# Patient Record
Sex: MALE | Race: BLACK OR AFRICAN AMERICAN | NOT HISPANIC OR LATINO | Employment: OTHER | URBAN - METROPOLITAN AREA
[De-identification: names, ages, dates, MRNs, and addresses within clinical notes are randomized per-mention and may not be internally consistent; named-entity substitution may affect disease eponyms.]

---

## 2017-01-05 ENCOUNTER — CLINICAL SUPPORT (OUTPATIENT)
Dept: REHABILITATION | Facility: HOSPITAL | Age: 57
End: 2017-01-05
Attending: NEUROLOGICAL SURGERY
Payer: MEDICARE

## 2017-01-05 DIAGNOSIS — M47.819 SPONDYLOSIS WITHOUT MYELOPATHY: Primary | ICD-10-CM

## 2017-01-05 DIAGNOSIS — M54.41 LOW BACK PAIN WITH RIGHT-SIDED SCIATICA, UNSPECIFIED BACK PAIN LATERALITY, UNSPECIFIED CHRONICITY: ICD-10-CM

## 2017-01-05 PROCEDURE — 97161 PT EVAL LOW COMPLEX 20 MIN: CPT | Performed by: PHYSICAL MEDICINE & REHABILITATION

## 2017-01-05 PROCEDURE — G8979 MOBILITY GOAL STATUS: HCPCS | Mod: CJ | Performed by: PHYSICAL MEDICINE & REHABILITATION

## 2017-01-05 PROCEDURE — 97110 THERAPEUTIC EXERCISES: CPT | Performed by: PHYSICAL MEDICINE & REHABILITATION

## 2017-01-05 PROCEDURE — G8978 MOBILITY CURRENT STATUS: HCPCS | Mod: CK | Performed by: PHYSICAL MEDICINE & REHABILITATION

## 2017-01-05 NOTE — PLAN OF CARE
Create Note         My Note 3:58 PM   Edit              Name:[facility]  Physician:Gildardo Chu MD  Date of eval:1/5/2017  Orders: Physical Therapy evaluate and treat  Clinic: [id number]  Diagnosis:  1. Spondylosis without myelopathy      2. Low back pain with right-sided sciatica, unspecified back pain laterality, unspecified chronicity            Precautions: None  Evaluation date: 1/5/2017  Visit # authorized: 1/30  Authorization period: 12/31/17  Plan of care expiration: 3/2/17     Start time:4:15 PM  Stop Time: 5:15 PM  Procedures: IE, TE     Timed:  Procedure: TE  Minutes: 30     Untimed:  Procedure: IE  Minutes: 30     Total Timed Minutes: 60  Total Timed Units: 2  Total Untimed Units: 1  Charged Billed # of Units: 3     Medicare Charge: $140.00  Medicare Cap Total: $140.00     Subjective      Chief complaint: Patient states having pain in right side of his back and into RLE for about 20 years. States fell down a flight of stairs and was in a coma for 3 days 20 years ago and has had pain in right low back and RLE since. States has not ever had any physical therapy.  Onset of pain : 20 years ago  Mechanism of onset : Fell down flight of stairs  PMH: High Blood Pressure; Diabetes     Aggravating factors: walking, sweeping  Easing factors: pain medication daily,   Sleep is  not disturbed. Sleeping position: on sides  Loss of Bowel/Bladder Function: (-)  PLOF  Includes:Able to perform self care; fix light meals and perform light housekeeping   Current functional limitations: Walking     Home/Living Environment: lives with sister     Pertinent PMH/Comorbidities: None     Patients structured exercise routine: rides regular bike  Exercise routine prior to onset: rides regular bike     Occupation: Pt is disabled and on disability.     MRI: 1.  There is degenerative spondylosis as detailed above.  There is an annular fissure within the far left lateral region of the L3-L4 disc which may account  "for a discogenic pain symptoms.  There is also mild to moderate bilateral foraminal stenosis, left greater than right, at L3-L4.  This could potentially affect the L3 nerves.  2.  There is mild to moderate left foraminal stenosis L4-L5.       Xray: Results: There is straightening of the normal lumbar lordosis with neutral positioning. No significant listhesis with flexion and extension positioning. The lumbar vertebral body heights and contours are within normal limits without evidence for acute fracture or subluxation. There is a slight convex left curvature. Further evaluation as warrented clinically.      Pain level with 0 being the lowest and 10 being the highest presently: 0/10  Pain level with 0 being the lowest and 10 being the highest at worst: 6/10  Pain level with 0 being the lowest and 10 being the highest at best: 0/10     Patient Goals: "I want to be able to walk without pain"     Objective      Postural examination in standing and sitting:  -  (+) forward head  -  (+) Forward shoulders  - (-) hip high  -(-) shoulder high  - (+)decreased lumbar lordosis  - (-)Thoracic kyphosis         Functional assessment:   - walking/gait: ambulated into clinic using rollator. Patient hikes and circumducts right hip when ambulating and leans forward with his trunk. Patient states he does this because it is painful to put weight on RLE. Patient is able to ambulate weightbearing on RLE with hip flexion/extension and right knee flexion and extension.  - sit to stand: Independent  - sit to supine: Independent   - supine to sit: Independent  - supine to prone: Independent      Flexibility testing:  - hamstrings: 90/90 test R -40 L -30   - gastrocnemius: DF ankle R 10 degrees L 10 degrees  - piriformis: (+) For tightness bilaterally   - quadriceps: (+)  For tightness bilaterally   - hip flexors:(+) For tightness bilaterally     Lumbar ROM: (measured in degrees)     Degrees Quality   Flexion    50 No c/o pain   Extension 10  " "C/o tightness in low back   Left Side Bending 20 C/o tightness in low back   Right Side Bending 20  C/o tightness in low back                      AROM bilateral hips WNL  AROM bilateral knees WNL     Dermatomes: (impaired/normal)    RLE LLE   L2 Intact Intact   L3 Intact Intact   L4 Intact Intact   L5 Intact Intact   S1 Intact Intact      Muscle Strength  MMT R L   Hip flexion 5/5 5/5   Hip abduction 5/5 5/5   Hip extension 5/5 5/5   Hip ER 5/5 5/5   Hip IR 5/5 5/5   Knee extension 5/5 5/5   Knee flexion 5/5 5/5   Ankle dorsiflexion 5/5 5/5   Ankle plantar flexion 5/5 5/5   Ankle inversion 5/5 5/5   Ankle eversion 5/5 5/5      Reflexes: 2+ bilateral LE's DTR     Special Tests: ((+): pos.; (-): neg.)   · SLR Test: (-) bilaterally  SKC Test: (-) bilateraally  · Hip Screen: (+) for hip flexor tightness bilaterally  · Repetitive Motion: Neither flexion or extension creates pain. Patient feels tightness in low back with dotson motions  · Prone Instability:NT  · Saddle Sensation: (-)     Palpation for condition: No c/o pain in lumbar spine with palpation     Joint Mobility: Hypomobility all lumbar vertebra     Endurance is Good     PT Evaluation Completed? Yes  Discussed Plan of Care with patient: Yes     Outcome measures:   Impairment function: Mobility  FOTO score: 42/100  · G-code current: CK at least 40% but < 60% impaired, limited or restricted   · G-code goal: CJ at least 20%, but < 40% impaired, limited or restricted   · Severity modifier selections based on the FOTO scoring, objective findings, and co-morbidity assessment  · PT reviewed FOTO scores for Sam Flores on 1/5/17.   · FOTO scores were entered into [facility] - see media section.     TREATMENT:  Therapeutic exercise: Sam received therapeutic exercises to develop strength, stabilization and endurance; flexibility and range of motion for 30 minutes including:see HEP sheet.   LTR X 10  Supine HSS 2 x 30" (B)  Supine Piriformis (S) (B) 2 x " "30'  DKTC X 10  Prone quad/hip flexor (S) (B) 2 x 30"  Prone on elbows press up X 10  Standing Back Extension X 10     Pt. Education: Instructed pt. regarding:proper technique with all exercises. Pt. to demonstrate good understanding of the education provided. Sam demonstrated good return demonstration of activities. No cultural, environmental, or spiritual barriers identified to treatment or learning.     Assessment   This is a 56 y.o. male referred to outpatient physical therapy and presents with a medical diagnosis of spondylosis without myelopathy and PT diagnosis/findings of gait abnormality and decreased flexibility of lumbar spine and lower extremities, demonstrating limitations as described in the problem list. Patient was in agreement with set goals and plan of care. Pt was given a written HEP along with posture education, instruction on body mechanics, activity modification/avoidance, and stretching exercises to low back and BLE's. Pt. verbally understood instructions and demonstrated proper form/technique. Pt was advised to perform these exercises free of pain, and discontinue use if symptoms persist/worsen. Pt will benefit from physical therapy services in order to maximize pain free functional independence. Rehab potential is good. Patient instructed to weight bear on RLE when ambulating.     Level of complexity is low based on patient history including the following comorbidities: N/A and/or personal factors: N/A that will impact the plan of care; the examination that includes body structure and function including the following body region(s): lumbar spine and bilateral lower extremities; body systems including musculoskeletal decreased AROM lumbar spine and decreased flexibility of lumbar spine and BLE's; neuromuscular gait abnomality; cardiovascular N/A; integumentary N/A; and activity limitations and participation limitations including mobility; FOTO outcomes score of 42/100; with a clinical " presentation of stable and uncomplicatedcharacteristics.     Medical necessity is demonstrated by the following IMPAIRMENTS/PROBLEM LIST:  Decreased range of motion  Increased pain with walking  Antalgic gait  Increased pain and limited with climbing stairs  ADL and household activities lead to increased pain and are limited  Functional impairment rating of: 50%     GOALS:   Short Term Goals: 4 weeks  Increase range of motion 25%  Be able to perform HEP with minimal cueing required  Ambulate with FWB RLE using rollator  Improve functional impairment of mobility to 30%     Long Term Goals: 8 weeks  Increase range of motion to 75% to 100% full   Restore ability to ambulate with normal pain free gait  Walking for ADL and exercise will be restored without increased pain  Restore ability to stand for ADL without increased pain  Restore ability to climb stairs in a reciprocal manner with min to 0 increased pain and/or difficulty  Restore ability to perform ADL's and household activities independently and without increased pain  Improve functional impairment of mobility to 20% or less     Plan      Pt will be treated by physical therapy 1-3 times a week for 8 weeks to include: Therapeutic exercises to increase ROM, strength and stabilization; joint and soft tissue mobilization with manual therapy techniques to decrease muscle tightness, pain and improve joint mobility; neuromuscular re-education to improve balance, coordination, kinesthetic sense and proprioception, therapeutic activities to improve coordination, strength and function, therapeutic taping to decrease pain, provide support and improve function of the LE(s); modalities such as moist heat, ice, ultrasound and electrical stimulation to increase circulation, decrease pain and inflammation; dry needling with manual therapy techniques to decrease pain, inflammation and swelling, increase circulation and promote healing process will be considered and utilized as  needed; aquatic physical therapy will be utilized as needed. Pt may be seen by PTA to carry out plan of care as part of the Rehab team.     I certify the need for these services furnished under this plan of treatment and while under my care.     ____________________________________ Physician/Referring Practitioner                  Date of Signature              Angelica Mckeon PT

## 2017-01-05 NOTE — PROGRESS NOTES
Name:[facility]  Physician:Gildardo Chu MD  Date of eval:1/5/2017  Orders:  Physical Therapy evaluate and treat  Clinic: [id number]  Diagnosis:  1. Spondylosis without myelopathy     2. Low back pain with right-sided sciatica, unspecified back pain laterality, unspecified chronicity         Precautions: None  Evaluation date: 1/5/2017  Visit # authorized: 1/30  Authorization period: 12/31/17  Plan of care expiration: 3/2/17    Start time:4:15 PM  Stop Time: 5:15 PM  Procedures: IE, TE    Timed:  Procedure: TE  Minutes:  30    Untimed:  Procedure: IE  Minutes: 30    Total Timed Minutes: 60  Total Timed Units: 2  Total Untimed Units: 1  Charged Billed # of Units: 3    Medicare Charge: $140.00  Medicare Cap Total: $140.00    Subjective     Chief complaint: Patient states having pain in right side of his back and into RLE for about 20 years.   fell down a flight of stairs and was in a coma for 3 days 20 years ago and has had pain in right low back and RLE since. States has not ever had any physical therapy.  Onset of pain : 20 years ago  Mechanism of onset :  Fell down flight of stairs  PMH: High Blood Pressure; Diabetes    Aggravating factors: walking, sweeping  Easing factors: pain medication daily,   Sleep is  not disturbed. Sleeping position: on sides  Loss of Bowel/Bladder Function: (-)  PLOF  Includes:Able to perform self care; fix light meals and perform light housekeeping   Current functional limitations: Walking    Home/Living Environment: lives with sister    Pertinent PMH/Comorbidities:  None    Patients structured exercise routine: rides regular bike  Exercise routine prior to onset: rides regular bike    Occupation: Pt is disabled and on disability.    MRI: 1.  There is degenerative spondylosis as detailed above.  There is an annular fissure within the far left lateral region of the L3-L4 disc which may account for a discogenic pain symptoms.  There is also mild to moderate bilateral  "foraminal stenosis, left greater than right, at L3-L4.  This could potentially affect the L3 nerves.  2.  There is mild to moderate left foraminal stenosis L4-L5.             Xray:  Results: There is straightening of the normal lumbar lordosis with neutral positioning. No significant listhesis with flexion and extension positioning. The lumbar vertebral body heights and contours are within normal limits without evidence for acute fracture or subluxation. There is a slight convex left curvature. Further evaluation as warrented clinically.     Pain level with 0 being the lowest and 10 being the highest presently: 0/10  Pain level with 0 being the lowest and 10 being the highest at worst: 6/10  Pain level with 0 being the lowest and 10 being the highest at best: 0/10     Patient Goals: "I want to be able to walk without pain"    Objective     Postural examination in standing and sitting:  -   (+) forward head  -  (+) Forward shoulders  - (-) hip high  -(-) shoulder high  - (+)decreased lumbar lordosis  -  (-)Thoracic kyphosis        Functional assessment:   - walking/gait: ambulated into clinic using rollator. Patient hikes and circumducts right hip when ambulating and leans forward with his trunk.  Patient states he does this because it is painful to put weight on RLE.  Patient is able to ambulate weightbearing on RLE with hip flexion/extension and right knee flexion and extension.  - sit to stand: Independent  - sit to supine: Independent       - supine to sit: Independent  - supine to prone: Independent     Flexibility testing:  - hamstrings:     90/90 test R -40 L -30           - gastrocnemius:   DF ankle R 10 degrees L 10 degrees  - piriformis:   (+)   For tightness bilaterally            - quadriceps:  (+)   For tightness bilaterally             - hip flexors:(+)  For tightness bilaterally    Lumbar ROM: (measured in degrees)    Degrees Quality   Flexion   50 No c/o pain   Extension 10  C/o tightness in low back " "  Left Side Bending 20 C/o tightness in low back   Right Side Bending 20  C/o tightness in low back               AROM bilateral hips WNL  AROM bilateral knees WNL    Dermatomes: (impaired/normal)     RLE LLE   L2 Intact Intact   L3 Intact Intact   L4 Intact Intact   L5 Intact Intact   S1 Intact Intact     Muscle Strength  MMT R L   Hip flexion 5/5 5/5   Hip abduction 5/5 5/5   Hip extension 5/5 5/5   Hip ER 5/5 5/5   Hip IR 5/5 5/5   Knee extension 5/5 5/5   Knee flexion 5/5 5/5   Ankle dorsiflexion 5/5 5/5   Ankle plantar flexion 5/5 5/5   Ankle inversion 5/5 5/5   Ankle eversion 5/5 5/5     Reflexes: 2+ bilateral LE's DTR    Special Tests: ((+): pos.; (-): neg.)   · SLR Test: (-)  bilaterally        SKC Test: (-)   bilateraally  · Hip Screen: (+) for hip flexor tightness bilaterally  · Repetitive Motion:  Neither flexion or extension creates pain. Patient feels tightness in low back with dotson motions  · Prone Instability:NT  · Saddle Sensation: (-)    Palpation for condition: No c/o pain in lumbar spine with palpation    Joint Mobility: Hypomobility all lumbar vertebra    Endurance is Good    PT Evaluation Completed? Yes  Discussed Plan of Care with patient: Yes    Outcome measures:    Impairment function:  Mobility  FOTO score: 42/100  G-code current: CK at least 40% but < 60% impaired, limited or restricted     G-code goal: CJ at least 20%, but < 40% impaired, limited or restricted          Severity modifier selections based on the FOTO scoring, objective findings, and co-morbidity assessment  PT reviewed FOTO scores for Sam Flores on 1/5/17.   FOTO scores were entered into [employer] - see media section.    TREATMENT:  Therapeutic exercise: Sam received therapeutic exercises to develop strength, stabilization and endurance; flexibility and range of motion for 30 minutes including:see HEP sheet.   LTR   X 10  Supine HSS  2 x 30"   (B)  Supine Piriformis (S) (B)   2 x 30'  DKTC       X 10  Prone " "quad/hip flexor (S)  (B)  2 x 30"  Prone on elbows press up   X 10  Standing Back Extension   X 10    Pt. Education: Instructed pt. regarding:proper technique with all exercises. Pt. to demonstrate good understanding of the education provided. [place] demonstrated good return demonstration of activities. No cultural, environmental, or spiritual barriers identified to treatment or learning.    Assessment   This is a 56 y.o. male referred to outpatient physical therapy and presents with a medical diagnosis of spondylosis without myelopathy and PT diagnosis/findings of gait abnormality and decreased flexibility of lumbar spine and lower extremities, demonstrating limitations as described in the problem list. Patient was in agreement with set goals and plan of care. Pt was given a written HEP along with posture education, instruction on body mechanics, activity modification/avoidance, and stretching exercises to low back and BLE's. Pt. verbally understood instructions and demonstrated proper form/technique. Pt was advised to perform these exercises free of pain, and discontinue use if symptoms persist/worsen. Pt will benefit from physical therapy services in order to maximize pain free functional independence. Rehab potential is good. Patient instructed to weight bear on RLE when ambulating.    Level of complexity is low based on patient history including the following comorbidities: N/A and/or personal factors: N/A that will impact the plan of care; the examination that includes body structure and function including the following body region(s): lumbar spine and bilateral lower extremities; body systems including musculoskeletal decreased AROM lumbar spine and decreased flexibility of lumbar spine and BLE's; neuromuscular gait abnomality; cardiovascular N/A; integumentary N/A; and activity limitations and participation limitations including mobility; FOTO outcomes score of 42/100; with a clinical presentation of " stable and uncomplicatedcharacteristics.    Medical necessity is demonstrated by the following IMPAIRMENTS/PROBLEM LIST:  Decreased range of motion  Increased pain with walking  Antalgic gait  Increased pain and limited with climbing stairs  ADL and household activities lead to increased pain and are limited  Functional impairment rating of: 50%    GOALS:   Short Term Goals:  4 weeks  Increase range of motion 25%  Be able to perform HEP with minimal cueing required  Ambulate with FWB RLE using rollator  Improve functional impairment of mobility to 30%    Long Term Goals: 8 weeks  Increase range of motion to 75% to 100% full   Restore ability to ambulate with normal pain free gait  Walking for ADL and exercise will be restored without increased pain  Restore ability to stand for ADL without increased pain  Restore ability to climb stairs in a reciprocal manner with min to 0 increased pain and/or difficulty  Restore ability to perform ADL's and household activities independently and without increased pain  Improve functional impairment of mobility to 20% or less    Plan     Pt will be treated by physical therapy 1-3 times a week for 8 weeks to include: Therapeutic exercises to increase ROM, strength and stabilization; joint and soft tissue mobilization with manual therapy techniques to decrease muscle tightness, pain and improve joint mobility; neuromuscular re-education to improve balance, coordination, kinesthetic sense and proprioception, therapeutic activities to improve coordination, strength and function, therapeutic taping to decrease pain, provide support and improve function of the LE(s); modalities such as moist heat, ice, ultrasound and electrical stimulation to increase circulation, decrease pain and inflammation; dry needling with manual therapy techniques to decrease pain, inflammation and swelling, increase circulation and promote healing process will be considered and utilized as needed;  aquatic  physical therapy will be utilized as needed.  Pt may be seen by PTA to carry out plan of care as part of the Rehab team.    I certify the need for these services furnished under this plan of treatment and while under my care.    ____________________________________ Physician/Referring Practitioner                                  Date of Signature          Angelica Mckeon, PT      Noemy Pretty, RICKI, PA-C  Neuro[facility]  [facility]  01/09/17

## 2017-01-10 ENCOUNTER — CLINICAL SUPPORT (OUTPATIENT)
Dept: REHABILITATION | Facility: HOSPITAL | Age: 57
End: 2017-01-10
Attending: NEUROLOGICAL SURGERY
Payer: MEDICARE

## 2017-01-10 DIAGNOSIS — G89.29 CHRONIC RIGHT-SIDED LOW BACK PAIN WITH RIGHT-SIDED SCIATICA: ICD-10-CM

## 2017-01-10 DIAGNOSIS — M54.41 CHRONIC RIGHT-SIDED LOW BACK PAIN WITH RIGHT-SIDED SCIATICA: ICD-10-CM

## 2017-01-10 DIAGNOSIS — M47.819 SPONDYLOSIS WITHOUT MYELOPATHY: Primary | ICD-10-CM

## 2017-01-10 PROCEDURE — 97112 NEUROMUSCULAR REEDUCATION: CPT | Performed by: PHYSICAL MEDICINE & REHABILITATION

## 2017-01-10 PROCEDURE — 97110 THERAPEUTIC EXERCISES: CPT | Performed by: PHYSICAL MEDICINE & REHABILITATION

## 2017-01-10 NOTE — PROGRESS NOTES
"Name: [facility] Number: [id number]  Diagnosis:   Encounter Diagnoses   Name Primary?    Chronic right-sided low back pain with right-sided sciatica     Spondylosis without myelopathy Yes     Physician: Gildardo Chu MD  Treatment Orders: PT Eval and Treat  Past Medical History   Diagnosis Date    Diabetes mellitus, type 2     Hypertension        Precautions: None  Visit # authorized: 1/30 on 1/10/2017  Authorization period: 12/31/17  Plan of care expiration: 3/2/17  Start time: 2:30 PM  Stop Time:   Procedures:  TE,NMRE      Timed:  Procedure: TE, x 2, NMRE  X 1  Minutes: 45      Untimed:  Procedure:   Minutes:       Total Timed Minutes: 45  Total Timed Units:  3  Total Untimed Units: 0  Charged Billed # of Units: 3      Medicare Charge: $98.00  Medicare Cap Total: $238.00    Subjective     Pt reports: Patient states not having any pain in low back or right LE today.  States has been performing HEP    Pain Scale: before treatment: 0/10 currently; after treatment: 0/10    Objective       TREATMENT  Therapeutic exercise: Sam received therapeutic exercises to develop flexibility for 30 minutes including:   LTR X 10  Supine HSS 2 x 30" (B)  Supine Piriformis (S) (B) 2 x 30'  DKTC X 10  Prone quad/hip flexor (S) (B) 2 x 30"  Prone on elbows press up X 10  Standing Back Extension X 10  Sitting Arch/Flatten(back extension)  10 x 5" hold  Stationary Bike  X 5' NC    Neuromuscular Re-education for balance and gait   X 15 minutes:  STanding in Parallell bars::  Marching   X 10  Mini Squats  X 10  Ambulation in parallel bars with emphasis on bending right knee in toe off phase to eliminate hip hiking and cicumduction x 4 length of bars  Ambulation with rollator with empahsis of right knee flexion in toe off  ~100'    Written Home Exercises Provided: Standing marching and mini squats; back extensor exercises  Pt demo good understanding of the education provided. Sam demonstrated good return " demonstration of activities.     Pt. education:  · Posture reeducation, body mechanics, HEP,   · No spiritual or educational barriers to learning provided  · Pt has no cultural, educational or language barriers to learning provided.    Assessment     Patient performed above exercise program with verbal cueing for proper technique and tolerated addition of back extensor exercises and neuro re-ed for improved balance and to ambulate with proper gait pattern on RLE. Patient continues to hip hike and circumduct right hip and not flex right knee in toe off phase. Patient instructed to work on flexing right knee in toe off phase when walking. Pt will continue to benefit from skilled outpatient physical therapy to address the remaining functional deficits, provide pt/family education, and to maximize pt's level of independence in the home and community environment. .     Goals:   Short Term Goals: 4 weeks  Increase range of motion 25%  Be able to perform HEP with minimal cueing required  Ambulate with FWB RLE using rollator  Improve functional impairment of mobility to 30%      Long Term Goals: 8 weeks  Increase range of motion to 75% to 100% full   Restore ability to ambulate with normal pain free gait  Walking for ADL and exercise will be restored without increased pain  Restore ability to stand for ADL without increased pain  Restore ability to climb stairs in a reciprocal manner with min to 0 increased pain and/or difficulty  Restore ability to perform ADL's and household activities independently and without increased pain  Improve functional impairment of mobility to 20% or less    Anticipated barriers to physical therapy: None  Pt's spiritual, cultural and educational needs considered and pt agreeable to plan of care and goals        Plan   Continue with established Plan of Care towards PT goals.              Angelica Mckeon, PT

## 2017-01-11 ENCOUNTER — CLINICAL SUPPORT (OUTPATIENT)
Dept: REHABILITATION | Facility: HOSPITAL | Age: 57
End: 2017-01-11
Attending: NEUROLOGICAL SURGERY
Payer: MEDICARE

## 2017-01-11 DIAGNOSIS — M47.819 SPONDYLOSIS WITHOUT MYELOPATHY: Primary | ICD-10-CM

## 2017-01-11 PROCEDURE — 97112 NEUROMUSCULAR REEDUCATION: CPT

## 2017-01-11 PROCEDURE — 97110 THERAPEUTIC EXERCISES: CPT

## 2017-01-11 NOTE — PROGRESS NOTES
"Name: [facility] Number: [id number]  Diagnosis:   Encounter Diagnoses   Name Primary?    Chronic right-sided low back pain with right-sided sciatica     Spondylosis without myelopathy Yes     Physician: Gildardo Chu MD  Treatment Orders: PT Eval and Treat  Past Medical History   Diagnosis Date    Diabetes mellitus, type 2     Hypertension        Precautions: None  Visit # authorized: 2/30 on 1/11/2017  Authorization period: 12/31/17  Plan of care expiration: 3/2/17  Start time: 3:00 PM  Stop Time: 3:55  Procedures:  TE,NMRE      Timed:  Procedure: TE, x 3, NMRE  X 1  Minutes: 55      Untimed:  Procedure:   Minutes:       Total Timed Minutes: 55  Total Timed Units:  3  Total Untimed Units: 0  Charged Billed # of Units: 3 (TE-3, NMRE-1)      Medicare Charge: $129.33  Medicare Cap Total: $367.33    Subjective     Pt reports: Patient states not having any pain in low back or right LE today.  States has been performing HEP    Pain Scale: before treatment: 0/10 currently; after treatment: 0/10    Objective     Patient ambulated into the clinic with 2 axillary crutches requiring cues for R knee bending to avoid hip circumduction.     TREATMENT  Therapeutic exercise: Sam received therapeutic exercises to develop flexibility for 45 minutes including:   LTR X 15  Supine HSS 2 x 30" (B)  Supine Piriformis (S) (B) 2 x 30"  DKTC X 15  TA's 10 x 3"  Bridging w/TA 1 x 10  March w/ TA 1 x 10  Prone quad/hip flexor (S) (B) 2 x 30"  Prone on elbows press up X 10  Standing Back Extension 10 x 30"  Standing shoulder ext w/ feet staggered 1 x 10 GTB  Sitting Arch/Flatten(back extension)  10 x 5" hold  Stationary Bike  L5 x 5' NC    Neuromuscular Re-education for balance and gait   X 15 minutes:  STanding in Parallell bars::  Marching   X 10  Mini Squats  X 10  Ambulation in parallel bars with emphasis on bending right knee in toe off phase to eliminate hip hiking and cicumduction x 4 length of bars  Ambulation " with crutches with empahsis of right knee flexion in toe off  ~100'    Written Home Exercises Provided: supine transverse abdominus activation, bridging w/ TA, supine marching w/ TA and standing shoulder extension w/ feet staggered.   Pt demo good understanding of the education provided. Sam demonstrated good return demonstration of activities.     Pt. education:  · Posture reeducation, body mechanics, HEP,   · No spiritual or educational barriers to learning provided  · Pt has no cultural, educational or language barriers to learning provided.    Assessment     Patient performed above exercise program with verbal cueing for proper technique and tolerated addition of back extensor exercises and neuro re-ed for improved balance and to ambulate with proper gait pattern on RLE. Patient continues to hip hike and circumduct right hip and not flex right knee in toe off phase. Patient instructed to work on flexing right knee in toe off phase when walking. Pt will continue to benefit from skilled outpatient physical therapy to address the remaining functional deficits, provide pt/family education, and to maximize pt's level of independence in the home and community environment. .     Goals:   Short Term Goals: 4 weeks  Increase range of motion 25%  Be able to perform HEP with minimal cueing required  Ambulate with FWB RLE using rollator  Improve functional impairment of mobility to 30%      Long Term Goals: 8 weeks  Increase range of motion to 75% to 100% full   Restore ability to ambulate with normal pain free gait  Walking for ADL and exercise will be restored without increased pain  Restore ability to stand for ADL without increased pain  Restore ability to climb stairs in a reciprocal manner with min to 0 increased pain and/or difficulty  Restore ability to perform ADL's and household activities independently and without increased pain  Improve functional impairment of mobility to 20% or less    Anticipated  barriers to physical therapy: None  Pt's spiritual, cultural and educational needs considered and pt agreeable to plan of care and goals        Plan   Continue with established Plan of Care towards PT goals.

## 2017-01-12 ENCOUNTER — OFFICE VISIT (OUTPATIENT)
Dept: SPINE | Facility: CLINIC | Age: 57
End: 2017-01-12
Payer: MEDICARE

## 2017-01-12 VITALS
BODY MASS INDEX: 17.03 KG/M2 | WEIGHT: 115 LBS | DIASTOLIC BLOOD PRESSURE: 80 MMHG | HEIGHT: 69 IN | HEART RATE: 100 BPM | SYSTOLIC BLOOD PRESSURE: 152 MMHG

## 2017-01-12 DIAGNOSIS — M47.819 SPONDYLOSIS WITHOUT MYELOPATHY: ICD-10-CM

## 2017-01-12 DIAGNOSIS — G89.29 CHRONIC PAIN OF RIGHT KNEE: Primary | ICD-10-CM

## 2017-01-12 DIAGNOSIS — M54.17 THORACIC AND LUMBOSACRAL NEURITIS: ICD-10-CM

## 2017-01-12 DIAGNOSIS — M25.561 CHRONIC PAIN OF RIGHT KNEE: Primary | ICD-10-CM

## 2017-01-12 DIAGNOSIS — M54.14 THORACIC AND LUMBOSACRAL NEURITIS: ICD-10-CM

## 2017-01-12 DIAGNOSIS — M54.41 CHRONIC MIDLINE LOW BACK PAIN WITH RIGHT-SIDED SCIATICA: ICD-10-CM

## 2017-01-12 DIAGNOSIS — G89.29 CHRONIC MIDLINE LOW BACK PAIN WITH RIGHT-SIDED SCIATICA: ICD-10-CM

## 2017-01-12 DIAGNOSIS — M51.37 DDD (DEGENERATIVE DISC DISEASE), LUMBOSACRAL: ICD-10-CM

## 2017-01-12 PROCEDURE — 99999 PR PBB SHADOW E&M-EST. PATIENT-LVL III: CPT | Mod: PBBFAC,,, | Performed by: PHYSICIAN ASSISTANT

## 2017-01-12 PROCEDURE — 99213 OFFICE O/P EST LOW 20 MIN: CPT | Mod: PBBFAC | Performed by: PHYSICIAN ASSISTANT

## 2017-01-12 PROCEDURE — 99213 OFFICE O/P EST LOW 20 MIN: CPT | Mod: S$PBB,,, | Performed by: PHYSICIAN ASSISTANT

## 2017-01-12 NOTE — PROGRESS NOTES
"Subjective:     Patient ID:  Sam Flores is a 56 y.o. male.      Chief Complaint: Back pain and right leg pain    HPI    Sam Flores is a 56 y.o. male who presents for MRI follow up.  He has been going to PT for his back and leg and he feels like it is helping.  He continue to have pain in the low back and pain in the right anterior thigh to the knee and right knee pain.  He has some stiffness in the right leg also.  No left leg pain.    His gait has improved but still abnormal.    Patient denies any recent accidents or trauma, no saddle anesthesias, and no bowel or bladder incontinence.      Review of Systems:  Constitution: Negative for chills, fever, night sweats and weight loss.   Musculoskeletal: Negative for falls.   Gastrointestinal: Negative for bowel incontinence, nausea and vomiting.   Genitourinary: Negative for bladder incontinence.   Neurological: Negative for disturbances in coordination and loss of balance.     Objective:      Vitals:    01/12/17 1605   BP: (!) 152/80   Pulse: 100   Weight: 52.2 kg (115 lb)   Height: 5' 9" (1.753 m)         Physical Exam:    General:  Sam Flores is well-developed, well-nourished, appears stated age, in no acute distress, alert and oriented to person, place, and time.    Patient sits comfortably in the exam room and answers questions appropriately. Grossly patient is able to move bilateral lower extremities without difficulty.     MRI Interpretation:     Lumbar spine MRI was personally reviewed today.  Mild DDD at L3-4 and L4-5.  There is bilateral NFS at L3-4 and L4-5 worse at L3-4.    Assessment:          1. Chronic pain of right knee    2. Spondylosis without myelopathy    3. DDD (degenerative disc disease), lumbosacral    4. Thoracic and lumbosacral neuritis    5. Chronic midline low back pain with right-sided sciatica             Plan:          Orders Placed This Encounter    Procedure Order to [facility]    Ambulatory referral to " Orthopedics     L3-4, L4-5 DDD and bilateral NFS at L3-4 and L4-5.  He may have a right L3 radiculopathy    -Continue PT  -Right L3 TESI through Dr. Carpenter in [place]  -Right knee pain and abnormal gait.  I am not sure if this is related to his right hip or knee.  I would like ortho to look at his right hip and knee and see if this may be contributing to his right leg swinging some when he walks    FU in three months      Follow-Up:  Return in about 3 months (around 4/12/2017). If there are any questions prior to this, the patient was instructed to contact the office.       Noemy Pretty, [place] [facility], PA-C  Neurosurgery  [facility]  [facility]

## 2017-01-12 NOTE — MR AVS SNAPSHOT
[facility]  [address]  [address]  [place] [address]  Phone: [phone]  Fax: [phone]                  Sam Flores   2017 5:00 PM   Office Visit    Description:  Male : 1960   Provider:  Noemy Pretty PA-C   Department:  [facility]           Reason for Visit     Follow-up           Diagnoses this Visit        Comments    Chronic pain of right knee    -  Primary     Spondylosis without myelopathy         DDD (degenerative disc disease), lumbosacral         Thoracic and lumbosacral neuritis         Chronic midline low back pain with right-sided sciatica                To Do List           Future Appointments        Provider Department Dept Phone    2017 5:00 PM Noemy Pretty PA-C [facility] [phone]    2017 3:00 PM Angelica Mckeon, PT [facility] [phone]    2017 1:45 PM Catrachito Cortez PA-C [facility] [phone]    2017 3:00 PM Angelica Mckeon, PT [facility] [phone]    2017 3:00 PM Haseeb Mcgill PTA [facility] [phone]      Goals (5 Years of Data)     None      Follow-Up and Disposition     Return in about 3 months (around 2017).      [facility] -  Assistance  Registered nurses in the [facility] provide clinical advisement, health education, appointment booking, and other advisory services.  Call for this free service at [phone].             Medications           Message regarding Medications     Verify the changes and/or additions to your medication regime listed below are the same as discussed with your clinician today.  If any of these changes or additions are incorrect, please notify your healthcare provider.             Verify that the below list of medications is an accurate representation of the medications you are  "currently taking.  If none reported, the list may be blank. If incorrect, please contact your healthcare provider. Carry this list with you in case of emergency.           Current Medications     glipiZIDE (GLUCOTROL) 10 MG TR24 Take 5 mg by mouth daily with breakfast.    lisinopril (PRINIVIL,ZESTRIL) 20 MG tablet Take 20 mg by mouth once daily.    metformin (FORTAMET) 500 mg 24 hr tablet Take 500 mg by mouth daily with breakfast.    naproxen (NAPROSYN) 375 MG tablet Take 375 mg by mouth 2 (two) times daily.    pravastatin (PRAVACHOL) 20 MG tablet Take 20 mg by mouth once daily.    sucralfate (CARAFATE) 1 gram tablet Take 1 g by mouth 4 (four) times daily.           Clinical Reference Information           Vital Signs - Last Recorded  Most recent update: 1/12/2017  4:06 PM by Cassidy Salazar    BP Pulse Ht Wt BMI    (!) 152/80 100 5' 9" (1.753 m) 52.2 kg (115 lb) 16.98 kg/m2      Blood Pressure          Most Recent Value    BP  (!)  152/80      Allergies as of 1/12/2017     No Known Allergies      Immunizations Administered on Date of Encounter - 1/12/2017     None      Orders Placed During Today's Visit      Normal Orders This Visit    Ambulatory referral to Orthopedics     Future Labs/Procedures Expected by Expires    Procedure Order to [facility]  As directed 1/12/2018      "

## 2017-01-13 ENCOUNTER — TELEPHONE (OUTPATIENT)
Dept: PAIN MEDICINE | Facility: CLINIC | Age: 57
End: 2017-01-13

## 2017-01-13 NOTE — TELEPHONE ENCOUNTER
Tried calling patient to schedule procedure, right Lumbar TF CYNDI at L3 with Dr. Carpenter in [place], referred by Dr. Chu.  Got message mailbox full could not leave voice message

## 2017-01-16 ENCOUNTER — TELEPHONE (OUTPATIENT)
Dept: PAIN MEDICINE | Facility: CLINIC | Age: 57
End: 2017-01-16

## 2017-01-16 NOTE — TELEPHONE ENCOUNTER
Again tried contacting patient to schedule for a procedure with Dr. Carpenter, get unable to leave message mailbox is full.

## 2017-01-17 ENCOUNTER — CLINICAL SUPPORT (OUTPATIENT)
Dept: REHABILITATION | Facility: HOSPITAL | Age: 57
End: 2017-01-17
Attending: NEUROLOGICAL SURGERY
Payer: MEDICARE

## 2017-01-17 DIAGNOSIS — M54.41 LOW BACK PAIN WITH RIGHT-SIDED SCIATICA, UNSPECIFIED BACK PAIN LATERALITY, UNSPECIFIED CHRONICITY: ICD-10-CM

## 2017-01-17 DIAGNOSIS — M47.819 SPONDYLOSIS WITHOUT MYELOPATHY: Primary | ICD-10-CM

## 2017-01-17 PROCEDURE — 97112 NEUROMUSCULAR REEDUCATION: CPT | Performed by: PHYSICAL MEDICINE & REHABILITATION

## 2017-01-17 PROCEDURE — 97110 THERAPEUTIC EXERCISES: CPT | Performed by: PHYSICAL MEDICINE & REHABILITATION

## 2017-01-17 NOTE — PROGRESS NOTES
"Name: [facility] Number: [id number]  Diagnosis:   Encounter Diagnoses   Name Primary?    Low back pain with right-sided sciatica, unspecified back pain laterality, unspecified chronicity     Spondylosis without myelopathy Yes     Physician: Gildardo Chu MD  Treatment Orders: PT Eval and Treat  Past Medical History   Diagnosis Date    Diabetes mellitus, type 2     Hypertension        Precautions: None  Visit # authorized: 1/30 on 1/17/2017  Authorization period: 12/31/17  Plan of care expiration: 3/2/17  Start time: 2:40 PM  Stop Time: 3:40PM  Procedures:  TE,NMRE      Timed:  Procedure: TE, x 3, NMRE  X 1  Minutes: 60      Untimed:  Procedure:   Minutes:       Total Timed Minutes: 60  Total Timed Units:  4  Total Untimed Units: 0  Charged Billed # of Units: 4      Medicare Charge: $130.00  Medicare Cap Total: $368.00    Subjective     Pt reports: Patient states not having any pain in low back or right LE today.Patient states walks in house without AD; and out of house either uses rollator or crutches!  States has been performing HEP    Pain Scale: before treatment: 0/10 currently; after treatment: 0/10    Objective       TREATMENT  Therapeutic exercise: Sam received therapeutic exercises to develop flexibility for 45 minutes including:   LTR X 10  Supine HSS 2 x 30" (B)  Supine Piriformis (S) (B) 2 x 30'  DKTC     2 X 10  Prone quad/hip flexor (S) (B) 2 x 30"  Prone press up X 10 (therapist holding hips down)  TA's 10 x 3"  Bridging w/TA    2 x 10  March w/ TA     2 x 10  Quadriped:  Alt Arms    5 x 5" hold                      Alt Legs   3 x 5"  hold  Standing shoulder ext w/ feet staggered 2 x 10 GTB  Standing Back Extension X 10  Sitting Arch/Flatten(back extension)       2 x 10 x 5" hold  Stationary Bike  X 5' NC    Neuromuscular Re-education for balance and gait   X 15 minutes:  Standing in Parallell bars:    Marching   X 10  Side Stepping over cones  X 2 trips  Forward lunges  X " 10  Mini Squats    2  X 10  Ambulation in parallel bars with emphasis on bending right knee in toe off phase to eliminate hip hiking and cicumduction x 4 length of bars  Ambulation with one and two crutches with empahsis of right knee flexion in toe off  ~100'    Written Home Exercises Provided: Quadriped exercises for back extensor strengthening  Pt demo good understanding of the education provided. [place] demonstrated good return demonstration of activities.     Pt. education:  · Posture reeducation, body mechanics, HEP,   · No spiritual or educational barriers to learning provided  · Pt has no cultural, educational or language barriers to learning provided.    Assessment     Patient performed above exercise program with verbal cueing for proper technique and tolerated addition of quadriped exercises for back extensor strengthening. Worked with patient on hid gait--flexing right  hip and knee with toe off using one and two crutches. Patient tends to not really use crutches with walking. Patient still will circumduct right hip when trying to walk at a faster pace. Patient instructed to work on flexing right knee in toe off phase when walking. Pt will continue to benefit from skilled outpatient physical therapy to address the remaining functional deficits, provide pt/family education, and to maximize pt's level of independence in the home and community environment. .     Goals:   Short Term Goals: 4 weeks  Increase range of motion 25%  Be able to perform HEP with minimal cueing required  Ambulate with FWB RLE using rollator  Improve functional impairment of mobility to 30%      Long Term Goals: 8 weeks  Increase range of motion to 75% to 100% full   Restore ability to ambulate with normal pain free gait  Walking for ADL and exercise will be restored without increased pain  Restore ability to stand for ADL without increased pain  Restore ability to climb stairs in a reciprocal manner with min to 0 increased pain  and/or difficulty  Restore ability to perform ADL's and household activities independently and without increased pain  Improve functional impairment of mobility to 20% or less    Anticipated barriers to physical therapy: None  Pt's spiritual, cultural and educational needs considered and pt agreeable to plan of care and goals        Plan   Continue with established Plan of Care towards PT goals.              Angelica Mckeon, PT

## 2017-01-18 ENCOUNTER — OFFICE VISIT (OUTPATIENT)
Dept: ORTHOPEDICS | Facility: CLINIC | Age: 57
End: 2017-01-18
Payer: MEDICARE

## 2017-01-18 ENCOUNTER — HOSPITAL ENCOUNTER (OUTPATIENT)
Dept: RADIOLOGY | Facility: HOSPITAL | Age: 57
Discharge: HOME | End: 2017-01-18
Attending: ORTHOPAEDIC SURGERY
Payer: MEDICARE

## 2017-01-18 VITALS
BODY MASS INDEX: 17.04 KG/M2 | SYSTOLIC BLOOD PRESSURE: 147 MMHG | WEIGHT: 115.06 LBS | DIASTOLIC BLOOD PRESSURE: 85 MMHG | RESPIRATION RATE: 16 BRPM | HEIGHT: 69 IN | HEART RATE: 87 BPM

## 2017-01-18 DIAGNOSIS — M25.561 ACUTE PAIN OF RIGHT KNEE: ICD-10-CM

## 2017-01-18 DIAGNOSIS — M25.561 ACUTE PAIN OF RIGHT KNEE: Primary | ICD-10-CM

## 2017-01-18 PROCEDURE — 73562 X-RAY EXAM OF KNEE 3: CPT | Mod: 26,RT,, | Performed by: RADIOLOGY

## 2017-01-18 PROCEDURE — 20610 DRAIN/INJ JOINT/BURSA W/O US: CPT | Mod: S$PBB,RT,, | Performed by: PHYSICIAN ASSISTANT

## 2017-01-18 PROCEDURE — 73560 X-RAY EXAM OF KNEE 1 OR 2: CPT | Mod: 26,59,, | Performed by: RADIOLOGY

## 2017-01-18 PROCEDURE — 99214 OFFICE O/P EST MOD 30 MIN: CPT | Mod: S$PBB,25,, | Performed by: PHYSICIAN ASSISTANT

## 2017-01-18 PROCEDURE — 73560 X-RAY EXAM OF KNEE 1 OR 2: CPT | Mod: TC,59,LT

## 2017-01-18 PROCEDURE — 99999 PR PBB SHADOW E&M-EST. PATIENT-LVL III: CPT | Mod: PBBFAC,,, | Performed by: PHYSICIAN ASSISTANT

## 2017-01-18 RX ORDER — BETAMETHASONE SODIUM PHOSPHATE AND BETAMETHASONE ACETATE 3; 3 MG/ML; MG/ML
6 INJECTION, SUSPENSION INTRA-ARTICULAR; INTRALESIONAL; INTRAMUSCULAR; SOFT TISSUE
Status: COMPLETED | OUTPATIENT
Start: 2017-01-18 | End: 2017-01-18

## 2017-01-18 RX ADMIN — BETAMETHASONE ACETATE AND BETAMETHASONE SODIUM PHOSPHATE 6 MG: 3; 3 INJECTION, SUSPENSION INTRA-ARTICULAR; INTRALESIONAL; INTRAMUSCULAR; SOFT TISSUE at 03:01

## 2017-01-18 NOTE — PROGRESS NOTES
SUBJECTIVE:     Chief Complaint & History of Present Illness:  Sam Flores is a New patient 56 y.o. male who is seen here today with a complaint of    Chief Complaint   Patient presents with    Right Knee - Pain    .  He's here for evaluation and potential treatment for some right knee pain associated with weightbearing and walking.  He does not have pain at rest but is having gait difficulty secondary to anterior knee pain.  He has a history of neurological and back issues and is currently in therapy working on gait and ambulation training.  He is sent over from neurology today to rule in or out knee pathology for his gait difficulties  On a scale of 1-10, with 10 being worst pain imaginable, he rates this pain as 1 on good days and 4 on bad days.  he describes the pain as sore.    Review of patient's allergies indicates:  No Known Allergies      Current Outpatient Prescriptions   Medication Sig Dispense Refill    glipiZIDE (GLUCOTROL) 10 MG TR24 Take 5 mg by mouth daily with breakfast.      lisinopril (PRINIVIL,ZESTRIL) 20 MG tablet Take 20 mg by mouth once daily.      metformin (FORTAMET) 500 mg 24 hr tablet Take 500 mg by mouth daily with breakfast.      naproxen (NAPROSYN) 375 MG tablet Take 375 mg by mouth 2 (two) times daily.      pravastatin (PRAVACHOL) 20 MG tablet Take 20 mg by mouth once daily.      sucralfate (CARAFATE) 1 gram tablet Take 1 g by mouth 4 (four) times daily.       No current facility-administered medications for this visit.        Past Medical History   Diagnosis Date    Diabetes mellitus, type 2     Hypertension        History reviewed. No pertinent past surgical history.    Vital Signs (Most Recent)  Vitals:    01/18/17 1414   BP: (!) 147/85   Pulse: 87   Resp: 16           Review of Systems:  ROS:  Constitutional: no fever or chills  Eyes: no visual changes  ENT: no nasal congestion or sore throat  Respiratory: no cough or shortness of breath  Cardiovascular: no chest  "pain or palpitations  Gastrointestinal: no nausea or vomiting, tolerating diet  Genitourinary: no hematuria or dysuria  Integument/Breast: no rash or pruritis  Hematologic/Lymphatic: no easy bruising or lymphadenopathy  Musculoskeletal: positive for arthralgias, back pain, myalgias and stiff joints, negative for bone pain and muscle weakness spondylosis with myelopathy  Neurological: no seizures or tremors  Behavioral/Psych: no auditory or visual hallucinations  Endocrine: no heat or cold intolerance, Positive type 2 diabetes                OBJECTIVE:     PHYSICAL EXAM:  Height: 5' 9" (175.3 cm) Weight: 52.2 kg (115 lb 1.3 oz), General Appearance: Well nourished, well developed, in no acute distress.  Neurological: Mood & affect are normal.  right Knee Exam:  Knee Range of Motion:0-125 degrees flexion   Effusion:none  Condition of skin:intact  Location of tenderness:Patella and Patellar tendon   Strength:5 of 5  Stability:  Lachman: stable, LCL: stable, MCL: stable, PCL: stable and posteromedial (dial): stable  Varus /Valgus stress:  normal  Perico:   negative/negative  Hip Examination:  full painless range of motion, without tenderness    RADIOGRAPHS:  X-rays taken today films viewed by me demonstrate well preserved joint spaces throughout all compartments of the knee no marked osteophytic changes sclerotic spurring fracture dislocation or other bony abnormalities    ASSESSMENT/PLAN:     Plan: We discussed with the patient at length all the different treatment options available for arthrosis of the knee including anti-inflammatories, acetaminophen, rest, ice, knee strengthening exercise, occasional cortisone injections for temporary relief, Viscosupplimentation injections, arthroscopic debridement osteotomy, and finally knee arthroplasty.   We'll proceed with therapeutic diagnostic cortisone injection of the right knee he will continue with his physical therapy for gait training and ambulation we'll plan to see " him back in 3 weeks to assess progress sooner symptoms dictate     The injection site was identified and the skin was prepared with a betadine solution. The  right knee was injected with 1 ml of Celestone and 5 ml Lidocaine under sterile technique. Sam Flores tolerated the procedure well, he was advised to rest the knee today, ice and elevation. he did receive immediate relief of the pain in and about his knee he was told this would be short lived and is secondary to the lidocaine. he may have an increase in his discomfort tonight followed by steady improvement over the next several days. I may take 1-3 weeks following the injection to get the full benefit of the medication.  I will see him back in 3 weeks. Sooner if he has any problems or concerns.    Sam Flores was advised to monitor his blood sugars closely over the next several days. The steroid may cause a rise in them. If his glucose levels rise to a point he is uncomfortable or he is unable to control them is is to contact his PCP or go immediately to the emergency department.

## 2017-01-18 NOTE — LETTER
January 18, 2017      Noemy Pretty PA-C  [address]  [facility] [address]           [facility]  [address]  [facility] [address]  Phone: [phone]          Patient: Sam Flores   MR Number: [id number]   YOB: 1960   Date of Visit: 1/18/2017       Dear Noemy Pretty:    Thank you for referring Sam Flores to me for evaluation. Attached you will find relevant portions of my assessment and plan of care.    If you have questions, please do not hesitate to call me. I look forward to following Sam Flores along with you.    Sincerely,    Catrachito Cortez PA-C    Enclosure  CC:  No Recipients    If you would like to receive this communication electronically, please contact [email or web address] or [phone] to request more information on [facility] access.    For providers and/or their staff who would like to refer a patient to [facility], please contact us through our one-stop-shop provider referral line, Michael Matos, at [phone].    If you feel you have received this communication in error or would no longer like to receive these types of communications, please e-mail [email or web address]

## 2017-01-19 ENCOUNTER — CLINICAL SUPPORT (OUTPATIENT)
Dept: REHABILITATION | Facility: HOSPITAL | Age: 57
End: 2017-01-19
Attending: NEUROLOGICAL SURGERY
Payer: MEDICARE

## 2017-01-19 DIAGNOSIS — M47.819 SPONDYLOSIS WITHOUT MYELOPATHY: Primary | ICD-10-CM

## 2017-01-19 DIAGNOSIS — M54.41 RIGHT-SIDED LOW BACK PAIN WITH RIGHT-SIDED SCIATICA, UNSPECIFIED CHRONICITY: ICD-10-CM

## 2017-01-19 PROCEDURE — 97110 THERAPEUTIC EXERCISES: CPT | Performed by: PHYSICAL MEDICINE & REHABILITATION

## 2017-01-19 PROCEDURE — 97112 NEUROMUSCULAR REEDUCATION: CPT | Performed by: PHYSICAL MEDICINE & REHABILITATION

## 2017-01-19 NOTE — PROGRESS NOTES
"Name: [facility] Number: [id number]  Diagnosis:   Encounter Diagnoses   Name Primary?    Right-sided low back pain with right-sided sciatica, unspecified chronicity     Spondylosis without myelopathy Yes     Physician: Gildardo Chu MD  Treatment Orders: PT Eval and Treat  Past Medical History   Diagnosis Date    Diabetes mellitus, type 2     Hypertension        Precautions: None  Visit # authorized:  on 2017  Authorization period: 17  Plan of care expiration: 3/2/17  Start time: 2:50 PM  Stop Time: 3:50  Procedures:  TE,NMRE      Timed:  Procedure: TE, x 2, NMRE  X 1  Minutes: 60      Untimed:  Procedure:   Minutes:       Total Timed Minutes: 60  Total Timed Units:  3  Total Untimed Units: 0  Charged Billed # of Units: 3      Medicare Charge: $98.00  Medicare Cap Total: $466.00    Subjective     Pt reports: Patient states not having any pain in low back or right LE today.Patient states saw MD yesterday and had an injection to right knee due to pain and states pain in right knee has gone away. States stopped using his crutches or rollator to walk.    Pain Scale: before treatment: 0/10 currently; after treatment: 0/10    Objective       TREATMENT  Therapeutic exercise: Sam received therapeutic exercises to develop flexibility for 35 minutes includin:1 with PT x 25 mins/ 10 mins supervised  LTR X 10  Supine HSS 2 x 30" (B)  Supine Piriformis (S) (B) 2 x 30'  Prone quad/hip flexor (S) (B) 2 x 30"  Prone press up X 10 (therapist holding hips down)  DKTC     2 X 10  TA's      10 x 10"  Bridging w/TA    2 x 10  March w/ TA     2 x 10  Quadriped:  Alt Arms    5 x 5" hold                      Alt Legs   3 x 5"  hold  Standing shoulder ext w/ feet staggered 2 x 10 GTB  Standing Back Extension X 10  Sitting Arch/Flatten(back extension)       2 x 10 x 5" hold  Stationary Bike  X 5' NC    Neuromuscular Re-education for balance and gait   X 20 minutes:  Standing in Parallell bars:  " "  Marching      2 X 10  Step Ups 4 " step      2 x 10  Side Stepping over cones  X 2 trips  Forward lunges     2  X 10  Mini Squats    2  X 10  Ambulation on treadmill at 1/0 mph x 2 minutes  with emphasis on bending right hip forward  and right knee in toe off phase to eliminate hip hiking and cicumduction      Written Home Exercises Provided:Continue with current HEP  Pt demo good understanding of the education provided. [place] demonstrated good return demonstration of activities.     Pt. education:  · Posture reeducation, body mechanics, HEP,   · No spiritual or educational barriers to learning provided  · Pt has no cultural, educational or language barriers to learning provided.    Assessment     Patient performed above exercise program with verbal cueing for proper technique and tolerated addition of quadriped exercises for back extensor strengthening.Patient ambulated into clinic independent of AD, but still hip hiking and circumducting on right side. Worked with patient on his gait--flexing right  hip and knee with toe off on treadmill as patient has treadmill at home he can practice on.. Patient tends to not really use crutches with walking. Patient still will circumduct right hip when trying to walk at a faster pace. Patient instructed to work on flexing right knee in toe off phase when walking. Pt will continue to benefit from skilled outpatient physical therapy to address the remaining functional deficits, provide pt/family education, and to maximize pt's level of independence in the home and community environment. .     Goals:   Short Term Goals: 4 weeks  Increase range of motion 25%  Be able to perform HEP with minimal cueing required  Ambulate with FWB RLE using rollator  Improve functional impairment of mobility to 30%      Long Term Goals: 8 weeks  Increase range of motion to 75% to 100% full   Restore ability to ambulate with normal pain free gait  Walking for ADL and exercise will be restored " without increased pain  Restore ability to stand for ADL without increased pain  Restore ability to climb stairs in a reciprocal manner with min to 0 increased pain and/or difficulty  Restore ability to perform ADL's and household activities independently and without increased pain  Improve functional impairment of mobility to 20% or less    Anticipated barriers to physical therapy: None  Pt's spiritual, cultural and educational needs considered and pt agreeable to plan of care and goals        Plan   Continue with established Plan of Care towards PT goals.              Angelica Mckeon, PT

## 2017-01-24 ENCOUNTER — CLINICAL SUPPORT (OUTPATIENT)
Dept: REHABILITATION | Facility: HOSPITAL | Age: 57
End: 2017-01-24
Attending: NEUROLOGICAL SURGERY
Payer: MEDICARE

## 2017-01-24 DIAGNOSIS — M54.41 RIGHT-SIDED LOW BACK PAIN WITH RIGHT-SIDED SCIATICA, UNSPECIFIED CHRONICITY: ICD-10-CM

## 2017-01-24 DIAGNOSIS — M47.819 SPONDYLOSIS WITHOUT MYELOPATHY: Primary | ICD-10-CM

## 2017-01-24 PROCEDURE — 97110 THERAPEUTIC EXERCISES: CPT

## 2017-01-24 PROCEDURE — 97112 NEUROMUSCULAR REEDUCATION: CPT

## 2017-01-24 NOTE — PROGRESS NOTES
"Name: [facility] Number: [id number]  Diagnosis:   Encounter Diagnoses   Name Primary?    Right-sided low back pain with right-sided sciatica, unspecified chronicity     Spondylosis without myelopathy Yes     Physician: Gildardo Chu MD  Treatment Orders: PT Eval and Treat  Past Medical History   Diagnosis Date    Diabetes mellitus, type 2     Hypertension        Precautions: None  Visit # authorized: 5/30 on 1/24/2017  Authorization period: 12/31/17  Plan of care expiration: 3/2/17  Start time: 3:00 PM  Stop Time: 4:00 PM  Procedures:  TE, NMRE      Timed:  Procedure: TE, x 2, NMRE  X 2  Minutes: 53      Untimed:  Procedure:   Minutes:       Total Timed Minutes: 53  Total Timed Units:  4  Total Untimed Units: 0  Charged Billed # of Units: 4      Medicare Charge: $130.74  Medicare Cap Total: $596.74    Subjective     Pt reports: Patient states not having any pain in low back or right LE today.       Pain Scale: before treatment: 0/10 currently; after treatment: 0/10    Objective       TREATMENT  Therapeutic exercise: [place] received therapeutic exercises to develop flexibility for 25 minutes including:   LTR  1 x 15  Supine HSS 2 x 30" (B)  Supine Piriformis (S) (B) 2 x 30'  Prone quad/hip flexor (S) (B) 2 x 30"   Prone press up X 10 (therapist holding hips down)   DKTC     2 X 10   TA's      10 x 10"   Bridging w/TA    1 x 25  March w/ TA     1 x 25  Quadriped:  Alt Arms    5 x 5" hold                       Alt Legs   5 x 5"  Hold   Standing shoulder ext w/ feet staggered  2 x 10 GTB   Standing Back Extension X 10   Sitting Arch/Flatten(back extension)       2 x 10 x 5" hold   Stationary Bike  X 5' NC     Neuromuscular Re-education for balance and gait   X 28 minutes:  Standing in Parallell bars:    Marching      2 X 10  Step Ups 4 " step      2 x 10  Side Stepping over cones  X 2 trips  Forward lunges     2  X 10  Mini Squats    2  X 10  Ambulation on treadmill at 1/0 mph x 2 minutes  with " emphasis on bending right hip forward  and right knee in toe off phase to eliminate hip hiking and cicumduction      Written Home Exercises Provided:Continue with current HEP  Pt demo good understanding of the education provided. Sam demonstrated good return demonstration of activities.     Pt. education:  · Posture reeducation, body mechanics, HEP,   · No spiritual or educational barriers to learning provided  · Pt has no cultural, educational or language barriers to learning provided.    Assessment     Patient performed above exercise program with verbal cueing for proper technique. Patient ambulated into clinic independent of AD, but still hip hiking and circumducting on right side. Worked with patient on his gait--flexing right  hip and knee with toe off on treadmill as patient has treadmill at home he can practice on.. Patient no longer uses crutches with walking. Patient still will circumduct right hip when trying to walk at a faster pace. Patient instructed to work on flexing right knee in toe off phase when walking. Pt will continue to benefit from skilled outpatient physical therapy to address the remaining functional deficits, provide pt/family education, and to maximize pt's level of independence in the home and community environment. .     Goals:   Short Term Goals: 4 weeks  Increase range of motion 25%  Be able to perform HEP with minimal cueing required  Ambulate with FWB RLE using rollator  Improve functional impairment of mobility to 30%      Long Term Goals: 8 weeks  Increase range of motion to 75% to 100% full   Restore ability to ambulate with normal pain free gait  Walking for ADL and exercise will be restored without increased pain  Restore ability to stand for ADL without increased pain  Restore ability to climb stairs in a reciprocal manner with min to 0 increased pain and/or difficulty  Restore ability to perform ADL's and household activities independently and without increased  pain  Improve functional impairment of mobility to 20% or less    Anticipated barriers to physical therapy: None  Pt's spiritual, cultural and educational needs considered and pt agreeable to plan of care and goals        Plan   Continue with established Plan of Care towards PT goals.

## 2017-01-26 ENCOUNTER — CLINICAL SUPPORT (OUTPATIENT)
Dept: REHABILITATION | Facility: HOSPITAL | Age: 57
End: 2017-01-26
Attending: NEUROLOGICAL SURGERY
Payer: MEDICARE

## 2017-01-26 DIAGNOSIS — M47.819 SPONDYLOSIS WITHOUT MYELOPATHY: Primary | ICD-10-CM

## 2017-01-26 DIAGNOSIS — M54.41 RIGHT-SIDED LOW BACK PAIN WITH RIGHT-SIDED SCIATICA, UNSPECIFIED CHRONICITY: ICD-10-CM

## 2017-01-26 PROCEDURE — 97112 NEUROMUSCULAR REEDUCATION: CPT

## 2017-01-26 PROCEDURE — 97110 THERAPEUTIC EXERCISES: CPT

## 2017-01-26 NOTE — PROGRESS NOTES
"Name: [facility] Number: [id number]  Diagnosis:   Encounter Diagnoses   Name Primary?    Right-sided low back pain with right-sided sciatica, unspecified chronicity     Spondylosis without myelopathy Yes     Physician: Gildardo Chu MD  Treatment Orders: PT Eval and Treat  Past Medical History   Diagnosis Date    Diabetes mellitus, type 2     Hypertension        Precautions: None  Visit # authorized: 6/30 on 1/26/2017  Authorization period: 12/31/17  Plan of care expiration: 3/2/17  Start time: 3:00 PM  Stop Time: 3:55 PM  Procedures:  TE, NMRE      Timed:  Procedure: TE, x 2, NMRE  X 1  Minutes: 50      Untimed:  Procedure:   Minutes:       Total Timed Minutes: 50  Total Timed Units:  3  Total Untimed Units: 0  Charged Billed # of Units: 3      Medicare Charge: $97.35  Medicare Cap Total: $694.09    Subjective     Pt reports: Patient states not having any pain in low back or right LE today.       Pain Scale: before treatment: 0/10 currently; after treatment: 0/10    Objective       TREATMENT  Therapeutic exercise: [place] received therapeutic exercises to develop flexibility for 30 minutes including:     LTR  1 x 15  Supine HSS 2 x 30" (B)  Supine Piriformis (S) (B) 2 x 30"  Prone quad/hip flexor (S) (B) 2 x 30"   Prone press up X 10 (therapist holding hips down)   DKTC     2 X 10   TA's      10 x 10"   Bridging w/TA    1 x 25  March w/ TA     1 x 25  Quadriped:  Alt Arms    5 x 5" hold                       Alt Legs   5 x 5"  Hold   Standing shoulder ext w/ feet staggered  2 x 10 GTB   Standing Back Extension X 10   Sitting Arch/Flatten(back extension)    2 x 10 x 5" hold   Stationary Bike  X 5' NC     Neuromuscular Re-education for balance and gait   X 20 minutes:  Standing in Parallell bars:    Marching      1 x 20  Step Ups 4 " step      2 x 10  Heel-toe walks   2 trips  Side Stepping over cones  X 3 trips  Forward lunges     2  X 10  Mini Squats    2  X 10  Ambulation 2 x 30 ft with " emphasis on bending right hip forward and right knee in toe off phase to eliminate hip hiking and cicumduction      Written Home Exercises Provided:Continue with current HEP  Pt demo good understanding of the education provided. Sam demonstrated good return demonstration of activities.     Pt. education:  · Posture reeducation, body mechanics, HEP,   · No spiritual or educational barriers to learning provided  · Pt has no cultural, educational or language barriers to learning provided.    Assessment     Patient performed above exercise program, along with new exercise added today, with verbal cueing for proper technique. Patient ambulated into clinic independent of AD, but still hip hiking and circumducting on right side. Worked with patient on his gait--flexing right  hip and knee with toe off walking down the suero of the clinic. Patient no longer uses crutches with walking. Patient still will circumduct right hip when trying to walk at a faster pace. Patient instructed to work on flexing right knee in toe off phase when walking. Pt will continue to benefit from skilled outpatient physical therapy to address the remaining functional deficits, provide pt/family education, and to maximize pt's level of independence in the home and community environment. .     Goals:   Short Term Goals: 4 weeks  Increase range of motion 25%  Be able to perform HEP with minimal cueing required  Ambulate with FWB RLE using rollator  Improve functional impairment of mobility to 30%      Long Term Goals: 8 weeks  Increase range of motion to 75% to 100% full   Restore ability to ambulate with normal pain free gait  Walking for ADL and exercise will be restored without increased pain  Restore ability to stand for ADL without increased pain  Restore ability to climb stairs in a reciprocal manner with min to 0 increased pain and/or difficulty  Restore ability to perform ADL's and household activities independently and without increased  pain  Improve functional impairment of mobility to 20% or less    Anticipated barriers to physical therapy: None  Pt's spiritual, cultural and educational needs considered and pt agreeable to plan of care and goals        Plan   Continue with established Plan of Care towards PT goals.

## 2017-01-27 ENCOUNTER — OFFICE VISIT (OUTPATIENT)
Dept: INTERNAL MEDICINE | Facility: CLINIC | Age: 57
End: 2017-01-27
Payer: MEDICARE

## 2017-01-27 VITALS
BODY MASS INDEX: 27.36 KG/M2 | DIASTOLIC BLOOD PRESSURE: 74 MMHG | SYSTOLIC BLOOD PRESSURE: 134 MMHG | HEIGHT: 69 IN | WEIGHT: 184.75 LBS | HEART RATE: 93 BPM

## 2017-01-27 DIAGNOSIS — E66.3 OVERWEIGHT (BMI 25.0-29.9): ICD-10-CM

## 2017-01-27 DIAGNOSIS — E78.5 HYPERLIPIDEMIA, UNSPECIFIED HYPERLIPIDEMIA TYPE: ICD-10-CM

## 2017-01-27 DIAGNOSIS — Z12.5 PROSTATE CANCER SCREENING: ICD-10-CM

## 2017-01-27 DIAGNOSIS — E11.9 TYPE 2 DIABETES MELLITUS WITHOUT COMPLICATION, WITHOUT LONG-TERM CURRENT USE OF INSULIN: Primary | ICD-10-CM

## 2017-01-27 DIAGNOSIS — Z23 NEED FOR PROPHYLACTIC VACCINATION WITH STREPTOCOCCUS PNEUMONIAE (PNEUMOCOCCUS) AND INFLUENZA VACCINES: ICD-10-CM

## 2017-01-27 DIAGNOSIS — I15.2 HYPERTENSION ASSOCIATED WITH DIABETES: ICD-10-CM

## 2017-01-27 DIAGNOSIS — E11.59 HYPERTENSION ASSOCIATED WITH DIABETES: ICD-10-CM

## 2017-01-27 DIAGNOSIS — Z00.00 PREVENTATIVE HEALTH CARE: ICD-10-CM

## 2017-01-27 DIAGNOSIS — M25.561 ARTHRALGIA OF RIGHT KNEE: ICD-10-CM

## 2017-01-27 DIAGNOSIS — Z12.11 COLON CANCER SCREENING: ICD-10-CM

## 2017-01-27 PROCEDURE — 90688 IIV4 VACCINE SPLT 0.5 ML IM: CPT | Mod: PBBFAC,PO | Performed by: INTERNAL MEDICINE

## 2017-01-27 PROCEDURE — 99386 PREV VISIT NEW AGE 40-64: CPT | Mod: S$PBB,,, | Performed by: INTERNAL MEDICINE

## 2017-01-27 PROCEDURE — 90670 PCV13 VACCINE IM: CPT | Mod: PBBFAC,PO | Performed by: INTERNAL MEDICINE

## 2017-01-27 PROCEDURE — 99999 PR PBB SHADOW E&M-EST. PATIENT-LVL III: CPT | Mod: PBBFAC,,, | Performed by: INTERNAL MEDICINE

## 2017-01-27 PROCEDURE — 99213 OFFICE O/P EST LOW 20 MIN: CPT | Mod: PBBFAC,PO | Performed by: INTERNAL MEDICINE

## 2017-01-27 RX ORDER — ASPIRIN 81 MG/1
81 TABLET ORAL DAILY
Refills: 0 | COMMUNITY
Start: 2017-01-27 | End: 2018-01-27

## 2017-01-30 ENCOUNTER — LAB VISIT (OUTPATIENT)
Dept: LAB | Facility: HOSPITAL | Age: 57
End: 2017-01-30
Attending: INTERNAL MEDICINE
Payer: MEDICARE

## 2017-01-30 DIAGNOSIS — I15.2 HYPERTENSION ASSOCIATED WITH DIABETES: ICD-10-CM

## 2017-01-30 DIAGNOSIS — Z00.00 PREVENTATIVE HEALTH CARE: ICD-10-CM

## 2017-01-30 DIAGNOSIS — E11.9 TYPE 2 DIABETES MELLITUS WITHOUT COMPLICATION, WITHOUT LONG-TERM CURRENT USE OF INSULIN: ICD-10-CM

## 2017-01-30 DIAGNOSIS — Z12.5 PROSTATE CANCER SCREENING: ICD-10-CM

## 2017-01-30 DIAGNOSIS — E78.5 HYPERLIPIDEMIA, UNSPECIFIED HYPERLIPIDEMIA TYPE: ICD-10-CM

## 2017-01-30 DIAGNOSIS — E11.59 HYPERTENSION ASSOCIATED WITH DIABETES: ICD-10-CM

## 2017-01-30 LAB
ALBUMIN SERPL BCP-MCNC: 4.2 G/DL
ALP SERPL-CCNC: 98 IU/L
ALT SERPL W/O P-5'-P-CCNC: 24 IU/L
ANION GAP SERPL CALC-SCNC: 9 MMOL/L
AST SERPL-CCNC: 23 IU/L
BASOPHILS # BLD AUTO: 0.04 K/UL
BASOPHILS NFR BLD: 0.8 %
BILIRUB SERPL-MCNC: 0.7 MG/DL
BUN SERPL-MCNC: 16 MG/DL
CALCIUM SERPL-MCNC: 9.6 MG/DL
CHLORIDE SERPL-SCNC: 104 MMOL/L
CHOLEST/HDLC SERPL: 3.2 {RATIO}
CO2 SERPL-SCNC: 29 MMOL/L
COMPLEXED PSA SERPL-MCNC: 0.84 NG/ML
CREAT SERPL-MCNC: 1.18 MG/DL
DIFFERENTIAL METHOD: ABNORMAL
EOSINOPHIL # BLD AUTO: 0.1 K/UL
EOSINOPHIL NFR BLD: 2.1 %
ERYTHROCYTE [DISTWIDTH] IN BLOOD BY AUTOMATED COUNT: 13.7 %
EST. GFR  (AFRICAN AMERICAN): >60 ML/MIN/1.73 M^2
EST. GFR  (NON AFRICAN AMERICAN): >60 ML/MIN/1.73 M^2
ESTIMATED AVG GLUCOSE: 137 MG/DL
GLUCOSE SERPL-MCNC: 89 MG/DL
HBA1C MFR BLD HPLC: 6.4 %
HCT VFR BLD AUTO: 41.6 %
HDL/CHOLESTEROL RATIO: 31.7 %
HDLC SERPL-MCNC: 139 MG/DL
HDLC SERPL-MCNC: 44 MG/DL
HGB BLD-MCNC: 13.5 G/DL
LDLC SERPL CALC-MCNC: 79 MG/DL
LYMPHOCYTES # BLD AUTO: 1.9 K/UL
LYMPHOCYTES NFR BLD: 36.2 %
MCH RBC QN AUTO: 26.1 PG
MCHC RBC AUTO-ENTMCNC: 32.5 %
MCV RBC AUTO: 80 FL
MONOCYTES # BLD AUTO: 0.4 K/UL
MONOCYTES NFR BLD: 7.4 %
NEUTROPHILS # BLD AUTO: 2.8 K/UL
NEUTROPHILS NFR BLD: 53.5 %
NONHDLC SERPL-MCNC: 95 MG/DL
PLATELET # BLD AUTO: 310 K/UL
PMV BLD AUTO: 9.5 FL
POTASSIUM SERPL-SCNC: 4.4 MMOL/L
PROT SERPL-MCNC: 7.2 G/DL
RBC # BLD AUTO: 5.18 M/UL
SODIUM SERPL-SCNC: 142 MMOL/L
TRIGL SERPL-MCNC: 80 MG/DL
WBC # BLD AUTO: 5.3 K/UL

## 2017-01-30 PROCEDURE — 80061 LIPID PANEL: CPT

## 2017-01-30 PROCEDURE — 83036 HEMOGLOBIN GLYCOSYLATED A1C: CPT | Mod: PO

## 2017-01-30 PROCEDURE — 84153 ASSAY OF PSA TOTAL: CPT

## 2017-01-30 PROCEDURE — 36415 COLL VENOUS BLD VENIPUNCTURE: CPT | Mod: PO

## 2017-01-30 PROCEDURE — 85025 COMPLETE CBC W/AUTO DIFF WBC: CPT | Mod: PO

## 2017-01-30 PROCEDURE — 80053 COMPREHEN METABOLIC PANEL: CPT | Mod: PO

## 2017-01-31 ENCOUNTER — CLINICAL SUPPORT (OUTPATIENT)
Dept: REHABILITATION | Facility: HOSPITAL | Age: 57
End: 2017-01-31
Attending: NEUROLOGICAL SURGERY
Payer: MEDICARE

## 2017-01-31 DIAGNOSIS — M47.819 SPONDYLOSIS WITHOUT MYELOPATHY: Primary | ICD-10-CM

## 2017-01-31 DIAGNOSIS — M54.41 MIDLINE LOW BACK PAIN WITH RIGHT-SIDED SCIATICA, UNSPECIFIED CHRONICITY: ICD-10-CM

## 2017-01-31 PROCEDURE — 97112 NEUROMUSCULAR REEDUCATION: CPT | Performed by: PHYSICAL MEDICINE & REHABILITATION

## 2017-01-31 PROCEDURE — 97110 THERAPEUTIC EXERCISES: CPT | Performed by: PHYSICAL MEDICINE & REHABILITATION

## 2017-01-31 NOTE — PROGRESS NOTES
"Name: [facility] Number: [id number]  Diagnosis:   Encounter Diagnoses   Name Primary?    Midline low back pain with right-sided sciatica, unspecified chronicity     Spondylosis without myelopathy Yes     Physician: Gildardo Chu MD  Treatment Orders: PT Eval and Treat  Past Medical History   Diagnosis Date    Coma      after head trauma from fall    Diabetes mellitus, type 2     Hyperlipidemia     Hypertension        Precautions: None  Visit # authorized:  on 2017  Authorization period: 17  Plan of care expiration: 3/2/17  Start time: 3:05 PM  Stop Time: 4:00PM    Procedures:  TE,NMRE      Timed:  Procedure: TE, x 2, NMRE  X 2  Minutes: 55      Untimed:  Procedure:   Minutes:       Total Timed Minutes: 55  Total Timed Units:  4  Total Untimed Units: 0  Charged Billed # of Units: 4      Medicare Charge: $130.00  Medicare Cap Total: $824.09    Subjective     Pt reports: Patient states not having any pain in low back or right LE today. States working hard to walk properly and does not have any pain with walking.    Pain Scale: before treatment: 0/10 currently; after treatment: 0/10    Objective       TREATMENT  Therapeutic exercise: [place] received therapeutic exercises to develop flexibility for 30 minutes includin:1 with PT  LTR X 10  Supine HSS 2 x 30" (B)  Supine Piriformis (S) (B) 2 x 30'  Prone quad/hip flexor (S) (B) 2 x 30"  Prone press up     2 X 10 (therapist holding hips down)  DKTC     2 X 10  TA's      10 x 10"  Bridging w/TA    2 x 10  March w/ TA     2 x 10  Quadriped:  Alt Arms    5 x 5" hold                      Alt Legs   3 x 5"  hold  Standing shoulder ext w/ feet staggered 2 x 10 GTB  Standing Back Extension X 10  Sitting Arch/Flatten(back extension)       2 x 10 x 5" hold  Stationary Bike  X 5' NC    Neuromuscular Re-education for balance and gait   X 25 minutes:  Standing in Parallell bars:    Marching      2 X 10  Step Ups 4 " step      2 x 10  Side " Stepping over cones  X 2 trips  Heel-toe walks 2 trips  Forward lunges     2  X 10  Mini Squats    2  X 10  Ambulation on treadmill at 1/0 mph x 3 minutes  with emphasis on bending right hip forward  and right knee in toe off phase to eliminate hip hiking and cicumduction      Written Home Exercises Provided:Continue with current HEP  Pt demo good understanding of the education provided. [place] demonstrated good return demonstration of activities.     Pt. education:  · Posture reeducation, body mechanics, HEP,   · No spiritual or educational barriers to learning provided  · Pt has no cultural, educational or language barriers to learning provided.    Assessment     Patient performed above exercise program with verbal cueing for proper technique and performing exercises well.Patient now  ambulates into clinic independent of AD, but still hip hiking and circumducting on right side when he walks fast; when patient slows down  and concentrates on right hip and knee flexion in toe off, he demonstrates improved and almost normal gait pattern.  Patient instructed to continue with HEP, walking on treadmill at home and concentrating on proper gait pattern when walking. Patient has made good improvements toward his physical therapy goals. Pt will continue to benefit from skilled outpatient physical therapy to address the remaining functional deficits, provide pt/family education, and to maximize pt's level of independence in the home and community environment. .     Goals:   Short Term Goals: 4 weeks  Increase range of motion 25%  Be able to perform HEP with minimal cueing required  Ambulate with FWB RLE using rollator  Improve functional impairment of mobility to 30%      Long Term Goals: 8 weeks  Increase range of motion to 75% to 100% full   Restore ability to ambulate with normal pain free gait  Walking for ADL and exercise will be restored without increased pain  Restore ability to stand for ADL without increased  pain  Restore ability to climb stairs in a reciprocal manner with min to 0 increased pain and/or difficulty  Restore ability to perform ADL's and household activities independently and without increased pain  Improve functional impairment of mobility to 20% or less    Anticipated barriers to physical therapy: None  Pt's spiritual, cultural and educational needs considered and pt agreeable to plan of care and goals        Plan   Continue with established Plan of Care towards PT goals.              Angelica Mckeon, PT

## 2017-02-04 ENCOUNTER — TELEPHONE (OUTPATIENT)
Dept: GASTROENTEROLOGY | Facility: CLINIC | Age: 57
End: 2017-02-04

## 2017-02-07 ENCOUNTER — CLINICAL SUPPORT (OUTPATIENT)
Dept: REHABILITATION | Facility: HOSPITAL | Age: 57
End: 2017-02-07
Attending: NEUROLOGICAL SURGERY
Payer: MEDICARE

## 2017-02-07 DIAGNOSIS — M54.41 RIGHT-SIDED LOW BACK PAIN WITH RIGHT-SIDED SCIATICA, UNSPECIFIED CHRONICITY: ICD-10-CM

## 2017-02-07 DIAGNOSIS — M47.819 SPONDYLOSIS WITHOUT MYELOPATHY: Primary | ICD-10-CM

## 2017-02-07 PROCEDURE — 97110 THERAPEUTIC EXERCISES: CPT | Performed by: PHYSICAL MEDICINE & REHABILITATION

## 2017-02-07 PROCEDURE — G8979 MOBILITY GOAL STATUS: HCPCS | Mod: CI | Performed by: PHYSICAL MEDICINE & REHABILITATION

## 2017-02-07 PROCEDURE — G8978 MOBILITY CURRENT STATUS: HCPCS | Mod: CJ | Performed by: PHYSICAL MEDICINE & REHABILITATION

## 2017-02-07 PROCEDURE — 97112 NEUROMUSCULAR REEDUCATION: CPT | Performed by: PHYSICAL MEDICINE & REHABILITATION

## 2017-02-07 NOTE — PROGRESS NOTES
Name: [facility] (formerly [facility]) Number: [id number]  Diagnosis:   Encounter Diagnoses   Name Primary?    Right-sided low back pain with right-sided sciatica, unspecified chronicity     Spondylosis without myelopathy Yes     Physician: Gildardo Chu MD  Treatment Orders: PT Eval and Treat  Past Medical History   Diagnosis Date    Coma 1995     after head trauma from fall    Diabetes mellitus, type 2     Hyperlipidemia     Hypertension        Precautions: None  Visit # authorized: 9/30 on 2/7/2017  Authorization period: 12/31/17  Plan of care expiration: 3/2/17  Start time: 2:50 PM  Stop Time: 3:45PM    Procedures:  TE,NMRE      Timed:  Procedure: TE, x 2, NMRE  X 2  Minutes: 55      Untimed:  Procedure:   Minutes:       Total Timed Minutes: 55  Total Timed Units:  4  Total Untimed Units: 0  Charged Billed # of Units: 4      Medicare Charge: $130.00  Medicare Cap Total: $954.09    Subjective     Pt reports: Patient states not having any pain in low back or right LE today. States working hard to walk properly and does not have any pain with walking.    Pain Scale: before treatment: 0/10 currently; after treatment: 0/10    Objective                                                                                    Progressive Note            2/7/17    Lumbar ROM: (measured in degrees)      Degrees Quality   Flexion     100 No c/o pain   Extension 10  No c/o tightness in low back   Left Side Bending 25 No c/o tightness in low back   Right Side Bending 25 No c/o tightness in low back     Walking/Gait: Now ambulates independent of AD holding his trunk erect and upright.  When patient concentrates able to flex right hip and knee in toe off; however, tends to exaggerate this motion.  When walking fast. Has tendency to hip hile and circumduct right hip with toe off.        1/517  Lumbar ROM: (measured in degrees)      Degrees Quality   Flexion     50 No c/o pain   Extension 10  C/o tightness in low back   Left Side  "Bending 20 C/o tightness in low back   Right Side Bending 20  C/o tightness in low back     Walking/gait: ambulated into clinic using rollator. Patient hikes and circumducts right hip when ambulating and leans forward with his trunk. Patient states he does this because it is painful to put weight on RLE. Patient is able to ambulate weightbearing on RLE with hip flexion/extension and right knee flexion and extension.    Outcome measures:   Impairment function: Mobility  FOTO score: 76/100  · G-code current: CJ at least 20% but < 40% impaired, limited or restricted          · G-code goal: CI at least 20%, but < 40% impaired, limited or restricted              · Severity modifier selections based on the FOTO scoring, objective findings, and co-morbidity assessment  · PT reviewed FOTO scores for Sam Flores on 17.   · FOTO scores were entered into [employer] - see media section.      TREATMENT  Therapeutic exercise: Sam received therapeutic exercises to develop flexibility for 30 minutes includin:1 with PT  LTR X 10  Supine HSS 2 x 30" (B)  Supine Piriformis (S) (B) 2 x 30'  Prone quad/hip flexor (S) (B) 2 x 30"  Prone press up     2 X 10 (therapist holding hips down)  DKTC     2 X 10  TA's      10 x 10"  Bridging w/TA    2 x 10  March w/ TA     2 x 10  Quadriped:  Alt Arms    5 x 5" hold                      Alt Legs   3 x 5"  hold  Standing shoulder ext w/ feet staggered 2 x 10 GTB  Standing Back Extension X 10  Sitting Arch/Flatten(back extension)       2 x 10 x 5" hold  Stationary Bike  X 5' NC    Neuromuscular Re-education for balance and gait   X 25 minutes:1: 1 with PT  Standing in Parallell bars:    Marching      2 X 10  Step Ups 4 " step      2 x 10  Side Stepping over cones  X 2 trips  Heel-toe walks 2 trips  Forward lunges     2  X 10  Mini Squats    3  X 10  Ambulation on treadmill at 1/0 mph x 3 minutes  with emphasis on bending right hip forward  and right knee in toe off phase to " eliminate hip hiking and cicumduction      Written Home Exercises Provided:Continue with current HEP  Pt demo good understanding of the education provided. [place] demonstrated good return demonstration of activities.     Pt. education:  · Posture reeducation, body mechanics, HEP,   · No spiritual or educational barriers to learning provided  · Pt has no cultural, educational or language barriers to learning provided.    Assessment     Reassessment for 9th visit (scheduled with PTA for 10th visit).  AROM of lumbar spine has improved and now WFL with no c/o pain or tightness.  Patient ambulating with improved posture and improved gait pattern. Patient performed above exercise program with minimal verbal cueing for proper technique and performing exercises well. Patient instructed to continue with HEP, walking on treadmill at home and concentrating on proper gait pattern when walking. Patient has made good improvements toward his physical therapy goals. Patient has met STG.  Pt will continue to benefit from skilled outpatient physical therapy to address the remaining functional deficits, provide pt/family education, and to maximize pt's level of independence in the home and community environment and to meet LTG.     Goals:   Short Term Goals: 4 weeks MET  Increase range of motion 25%  Be able to perform HEP with minimal cueing required  Ambulate with FWB RLE using rollator  Improve functional impairment of mobility to 30%      Long Term Goals: 8 weeks  Increase range of motion to 75% to 100% full   Restore ability to ambulate with normal pain free gait  Walking for ADL and exercise will be restored without increased pain  Restore ability to stand for ADL without increased pain  Restore ability to climb stairs in a reciprocal manner with min to 0 increased pain and/or difficulty  Restore ability to perform ADL's and household activities independently and without increased pain  Improve functional impairment of  mobility to 20% or less    Anticipated barriers to physical therapy: None  Pt's spiritual, cultural and educational needs considered and pt agreeable to plan of care and goals        Plan   Continue with established Plan of Care towards PT goals.              Angelica Mckeon, PT

## 2017-02-10 ENCOUNTER — CLINICAL SUPPORT (OUTPATIENT)
Dept: REHABILITATION | Facility: HOSPITAL | Age: 57
End: 2017-02-10
Attending: NEUROLOGICAL SURGERY
Payer: MEDICARE

## 2017-02-10 DIAGNOSIS — M47.819 SPONDYLOSIS WITHOUT MYELOPATHY: Primary | ICD-10-CM

## 2017-02-10 DIAGNOSIS — M54.41 RIGHT-SIDED LOW BACK PAIN WITH RIGHT-SIDED SCIATICA, UNSPECIFIED CHRONICITY: ICD-10-CM

## 2017-02-10 PROCEDURE — 97110 THERAPEUTIC EXERCISES: CPT

## 2017-02-10 PROCEDURE — 97112 NEUROMUSCULAR REEDUCATION: CPT

## 2017-02-10 NOTE — PROGRESS NOTES
Name: [facility] Number: [id number]  Diagnosis:   Encounter Diagnoses   Name Primary?    Right-sided low back pain with right-sided sciatica, unspecified chronicity     Spondylosis without myelopathy Yes     Physician: Gildardo Chu MD  Treatment Orders: PT Eval and Treat  Past Medical History   Diagnosis Date    Coma 1995     after head trauma from fall    Diabetes mellitus, type 2     Hyperlipidemia     Hypertension        Precautions: None  Visit # authorized: 10/30 on 2/10/2017  Authorization period: 12/31/17  Plan of care expiration: 3/2/17  Start time: 10:46 AM  Stop Time: 11:46 AM    Procedures:  TE,NMRE      Timed:  Procedure: TE, x 2, NMRE  X 2  Minutes: 55      Untimed:  Procedure:   Minutes:       Total Timed Minutes: 55  Total Timed Units:  4  Total Untimed Units: 0  Charged Billed # of Units: 4      Medicare Charge: $130.74  Medicare Cap Total: $1084.83    Subjective     Pt reports: Patient states not having any pain in low back or right LE today.    Pain Scale: before treatment: 0/10 currently; after treatment: 0/10    Objective                                                                                    Progressive Note            2/7/17    Lumbar ROM: (measured in degrees)      Degrees Quality   Flexion     100 No c/o pain   Extension 10  No c/o tightness in low back   Left Side Bending 25 No c/o tightness in low back   Right Side Bending 25 No c/o tightness in low back     Walking/Gait: Now ambulates independent of AD holding his trunk erect and upright.  When patient concentrates he is able to flex right hip and knee in toe off; however, tends to exaggerate this motion.  When walking fast, has tendency to hip hike and circumduct right hip with toe off.        1/517  Lumbar ROM: (measured in degrees)      Degrees Quality   Flexion     50 No c/o pain   Extension 10  C/o tightness in low back   Left Side Bending 20 C/o tightness in low back   Right Side Bending 20  C/o  "tightness in low back     Walking/gait: ambulated into clinic using rollator. Patient hikes and circumducts right hip when ambulating and leans forward with his trunk. Patient states he does this because it is painful to put weight on RLE. Patient is able to ambulate weightbearing on RLE with hip flexion/extension and right knee flexion and extension.    Outcome measures:   Impairment function: Mobility  FOTO score: 76/100  · G-code current: CJ at least 20% but < 40% impaired, limited or restricted          · G-code goal: CI at least 20%, but < 40% impaired, limited or restricted              · Severity modifier selections based on the FOTO scoring, objective findings, and co-morbidity assessment  · PT reviewed FOTO scores for Sam Flores on 17.   · FOTO scores were entered into [employer] - see media section.      TREATMENT  Therapeutic exercise: Sam received therapeutic exercises, along with today's progressions as noted in bold, to develop flexibility for 30 minutes includin:1 with PTA  LTR X 10  Supine HSS 2 x 30" (B)  Supine Piriformis (S) (B) 2 x 30"-  Prone quad/hip flexor (S) (B) 2 x 30"  Prone press up     2 X 10 (therapist holding hips down)  DKTC     2 X 10  TA's      10 x 10"  Bridging w/TA    2 x 10  March w/ TA     2 x 10  Quadriped:  Alt Arms    5 x 5" hold                      Alt Legs   5 x 5"  Hold  Standing shoulder ext w/ feet staggered 2 x 10 GTB  Standing Back Extension  2 x 30"  Sitting Arch/Flatten(back extension)       2 x 10 x 5" hold  Stationary Bike  L5 x 5' NC    Neuromuscular Re-education for balance and gait, along with new exercises added and today's progressions, X 25 minutes: 1: 1 with PTA  Standing in Parallell bars:    Marching      2 X 10  Step Ups 4 " step      2 x 10  Side Stepping over cones  X 2 trips  Forward stepping over cones - 6 cones @ 3 ea foot: x 2 trips  Heel-toe walks 3 trips  Forward lunges     2  X 10  Mini Squats    3  X 10  Walking backwards " x 3 trips  Ambulation on treadmill at 1/0 mph x 3 minutes with emphasis on bending right hip forward and right knee in toe off phase to eliminate hip hiking and cicumduction      Written Home Exercises Provided:Continue with current HEP  Pt demo good understanding of the education provided. Sam demonstrated good return demonstration of activities.     Pt. education:  · Posture reeducation, body mechanics, HEP,   · No spiritual or educational barriers to learning provided  · Pt has no cultural, educational or language barriers to learning provided.    Assessment     Patient performed above exercise program with minimal verbal cueing for proper technique.  Patient continues to require cues to flex his right hip and knee with his first step when getting up from seated position  Patient instructed to continue with HEP, walking on treadmill at home and concentrating on proper gait pattern when walking.  Pt will continue to benefit from skilled outpatient physical therapy to address the remaining functional deficits, provide pt/family education, and to maximize pt's level of independence in the home and community environment and to meet LTG.     Goals:   Short Term Goals: 4 weeks MET  Increase range of motion 25%  Be able to perform HEP with minimal cueing required  Ambulate with FWB RLE using rollator  Improve functional impairment of mobility to 30%      Long Term Goals: 8 weeks  Increase range of motion to 75% to 100% full   Restore ability to ambulate with normal pain free gait  Walking for ADL and exercise will be restored without increased pain  Restore ability to stand for ADL without increased pain  Restore ability to climb stairs in a reciprocal manner with min to 0 increased pain and/or difficulty  Restore ability to perform ADL's and household activities independently and without increased pain  Improve functional impairment of mobility to 20% or less    Anticipated barriers to physical therapy: None  Pt's  spiritual, cultural and educational needs considered and pt agreeable to plan of care and goals        Plan   Continue with established Plan of Care towards PT goals.

## 2017-02-14 ENCOUNTER — CLINICAL SUPPORT (OUTPATIENT)
Dept: REHABILITATION | Facility: HOSPITAL | Age: 57
End: 2017-02-14
Attending: NEUROLOGICAL SURGERY
Payer: MEDICARE

## 2017-02-14 DIAGNOSIS — M54.41 BILATERAL LOW BACK PAIN WITH RIGHT-SIDED SCIATICA, UNSPECIFIED CHRONICITY: ICD-10-CM

## 2017-02-14 DIAGNOSIS — M47.819 SPONDYLOSIS WITHOUT MYELOPATHY: Primary | ICD-10-CM

## 2017-02-14 PROCEDURE — 97112 NEUROMUSCULAR REEDUCATION: CPT | Performed by: PHYSICAL MEDICINE & REHABILITATION

## 2017-02-14 PROCEDURE — 97110 THERAPEUTIC EXERCISES: CPT | Performed by: PHYSICAL MEDICINE & REHABILITATION

## 2017-02-14 NOTE — PROGRESS NOTES
"Name: [facility] Number: [id number]  Diagnosis:   Encounter Diagnoses   Name Primary?    Bilateral low back pain with right-sided sciatica, unspecified chronicity     Spondylosis without myelopathy Yes     Physician: Gildardo Chu MD  Treatment Orders: PT Eval and Treat  Past Medical History   Diagnosis Date    Coma      after head trauma from fall    Diabetes mellitus, type 2     Hyperlipidemia     Hypertension        Precautions: None  Visit # authorized:  on 2017  Authorization period: 17  Plan of care expiration: 3/2/17  Start time: 3:05 PM  Stop Time: 4:00 PM    Procedures:  TE,NMRE      Timed:  Procedure: TE, x 1, NMRE  X 2  Minutes: 55      Untimed:  Procedure: 0  Minutes: 0      Total Timed Minutes: 55  Total Timed Units:  3  Total Untimed Units: 0  Charged Billed # of Units: 3      Medicare Charge: $96.00  Medicare Cap Total: $1180.83    Subjective     Pt reports: Patient states not having any pain in low back or right LE today. States working hard to walk properly and does not have any pain with walking.    Pain Scale: before treatment: 0/10 currently; after treatment: 0/10    Objective                                                                                       .      TREATMENT  Therapeutic exercise: [place] received therapeutic exercises to develop flexibility for 30 minutes includin:1 with PT x 15 mins/15 mins supervised  LTR X 10  Supine HSS 2 x 30" (B)  Supine Piriformis (S) (B) 2 x 30'  Prone quad/hip flexor (S) (B) 2 x 30"  Prone press up     2 X 10 (therapist holding hips down)  DKTC     2 X 10  TA's      10 x 10"  Bridging w/TA    2 x 10  March w/ TA     2 x 10  Quadriped:  Alt Arms    5 x 5" hold                      Alt Legs   3 x 5"  hold  Standing shoulder ext w/ feet staggered 2 x 10 GTB  Standing Back Extension X 10  Sitting Arch/Flatten(back extension)       2 x 10 x 5" hold  Stationary Bike  X 5' NC    Neuromuscular Re-education for " "balance and gait   X 25 minutes:1: 1 with PT  Standing in Parallell bars:    Marching      2 X 10  Step Ups 4 " step      2 x 10  Side Stepping over cones  X 2 trips  Heel-toe walks 2 trips  Forward lunges     2  X 10  Mini Squats    3  X 10  Ambulation on treadmill at 1/0 mph x 3 minutes  with emphasis on bending right hip forward  and right knee in toe off phase to eliminate hip hiking and cicumduction      Written Home Exercises Provided:Continue with current HEP  Pt demo good understanding of the education provided. Sam demonstrated good return demonstration of activities.     Pt. education:  · Posture reeducation, body mechanics, HEP,   · No spiritual or educational barriers to learning provided  · Pt has no cultural, educational or language barriers to learning provided.    Assessment      Patient performed above exercise program with minimal verbal cueing for proper technique and performing exercises well. Patient instructed to continue with HEP, walking on treadmill at home and concentrating on proper gait pattern when walking. Patient has made good improvements toward his physical therapy goals. Patient has met STG.  Pt will continue to benefit from skilled outpatient physical therapy to address the remaining functional deficits, provide pt/family education, and to maximize pt's level of independence in the home and community environment and to meet LTG.     Goals:   Short Term Goals: 4 weeks MET  Increase range of motion 25%  Be able to perform HEP with minimal cueing required  Ambulate with FWB RLE using rollator  Improve functional impairment of mobility to 30%      Long Term Goals: 8 weeks  Increase range of motion to 75% to 100% full   Restore ability to ambulate with normal pain free gait  Walking for ADL and exercise will be restored without increased pain  Restore ability to stand for ADL without increased pain  Restore ability to climb stairs in a reciprocal manner with min to 0 increased pain " and/or difficulty  Restore ability to perform ADL's and household activities independently and without increased pain  Improve functional impairment of mobility to 20% or less    Anticipated barriers to physical therapy: None  Pt's spiritual, cultural and educational needs considered and pt agreeable to plan of care and goals        Plan   Continue with established Plan of Care towards PT goals.              Angelica Mckeon, PT

## 2017-02-16 ENCOUNTER — CLINICAL SUPPORT (OUTPATIENT)
Dept: REHABILITATION | Facility: HOSPITAL | Age: 57
End: 2017-02-16
Attending: NEUROLOGICAL SURGERY
Payer: MEDICARE

## 2017-02-16 DIAGNOSIS — M47.819 SPONDYLOSIS WITHOUT MYELOPATHY: Primary | ICD-10-CM

## 2017-02-16 DIAGNOSIS — M54.41 RIGHT-SIDED LOW BACK PAIN WITH RIGHT-SIDED SCIATICA, UNSPECIFIED CHRONICITY: ICD-10-CM

## 2017-02-16 PROCEDURE — 97110 THERAPEUTIC EXERCISES: CPT | Performed by: PHYSICAL MEDICINE & REHABILITATION

## 2017-02-16 PROCEDURE — 97112 NEUROMUSCULAR REEDUCATION: CPT | Performed by: PHYSICAL MEDICINE & REHABILITATION

## 2017-02-16 NOTE — PROGRESS NOTES
"Name: [facility] Number: [id number]  Diagnosis:   Encounter Diagnoses   Name Primary?    Right-sided low back pain with right-sided sciatica, unspecified chronicity     Spondylosis without myelopathy Yes     Physician: Gildardo Chu MD  Treatment Orders: PT Eval and Treat  Past Medical History   Diagnosis Date    1995     after head trauma from fall    Diabetes mellitus, type 2     Hyperlipidemia     Hypertension        Precautions: None  Visit # authorized:  on 2017  Authorization period: 17  Plan of care expiration: 3/2/17  Start time: 3:00 PM  Stop Time: 4:00 PM    Procedures:  TE,NMRE      Timed:  Procedure: TE, x 2, NMRE  X 2  Minutes: 55      Untimed:  Procedure: 0  Minutes: 0      Total Timed Minutes: 55  Total Timed Units:  4  Total Untimed Units: 0  Charged Billed # of Units: 4      Medicare Charge: $130.00  Medicare Cap Total: $1210.00    Subjective     Pt reports: Patient states not having any pain in low back or right LE today. States working hard to walk properly and does not have any pain with walking.    Pain Scale: before treatment: 0/10 currently; after treatment: 0/10    Objective                                                                                       .      TREATMENT  Therapeutic exercise: [place] received therapeutic exercises to develop flexibility for 30 minutes includin:1 with PT   LTR X 10  Supine HSS 2 x 30" (B)  Supine Piriformis (S) (B) 2 x 30'  Prone quad/hip flexor (S) (B) 2 x 30"  Prone press up     2 X 10 (therapist holding hips down)  DKTC     2 X 10  TA's      10 x 10"   w/TA    2 x 10  March w/ TA     2 x 10  Quadriped:  Alt Arms    5 x 5" hold                      Alt Legs   3 x 5"  hold  Standing shoulder ext w/ feet staggered 2 x 10 GTB  Standing Back Extension X 10  Sitting Arch/Flatten(back extension)       2 x 10 x 5" hold  Stationary Bike  X 5' NC    Neuromuscular Re-education for balance and gait   X 25 " "minutes:1: 1 with PT  Standing in Parallell bars:    Marching      2 X 10  Step Ups 4 " step      2 x 10  Side Stepping over cones  X 2 trips  Walking backwards   20' x 4  Heel-toe walks 2 trips  Forward lunges     2  X 10  Mini Squats    3  X 10  Ambulation on treadmill at 1/0 mph x 3 minutes  with emphasis on bending right hip forward  and right knee in toe off phase to eliminate hip hiking and cicumduction      Written Home Exercises Provided:Continue with current HEP  Pt demo good understanding of the education provided. Sam demonstrated good return demonstration of activities.     Pt. education:  · Posture reeducation, body mechanics, HEP,   · No spiritual or educational barriers to learning provided  · Pt has no cultural, educational or language barriers to learning provided.    Assessment      Patient performed above exercise program with minimal verbal cueing for proper technique and performing exercises well. Patient instructed to continue with HEP, walking on treadmill at home and concentrating on proper gait pattern when walking. Patient has made good improvements toward his physical therapy goals. Patient has met STG.  Pt will continue to benefit from skilled outpatient physical therapy to address the remaining functional deficits, provide pt/family education, and to maximize pt's level of independence in the home and community environment and to meet LTG.     Goals:   Short Term Goals: 4 weeks MET  Increase range of motion 25%  Be able to perform HEP with minimal cueing required  Ambulate with FWB RLE using rollator  Improve functional impairment of mobility to 30%      Long Term Goals: 8 weeks  Increase range of motion to 75% to 100% full   Restore ability to ambulate with normal pain free gait  Walking for ADL and exercise will be restored without increased pain  Restore ability to stand for ADL without increased pain  Restore ability to climb stairs in a reciprocal manner with min to 0 increased " pain and/or difficulty  Restore ability to perform ADL's and household activities independently and without increased pain  Improve functional impairment of mobility to 20% or less    Anticipated barriers to physical therapy: None  Pt's spiritual, cultural and educational needs considered and pt agreeable to plan of care and goals        Plan   Continue with established Plan of Care towards PT goals.              Angelica Mckeon, PT

## 2017-02-23 ENCOUNTER — CLINICAL SUPPORT (OUTPATIENT)
Dept: REHABILITATION | Facility: HOSPITAL | Age: 57
End: 2017-02-23
Attending: NEUROLOGICAL SURGERY
Payer: MEDICARE

## 2017-02-23 DIAGNOSIS — M54.41 RIGHT-SIDED LOW BACK PAIN WITH RIGHT-SIDED SCIATICA, UNSPECIFIED CHRONICITY: ICD-10-CM

## 2017-02-23 DIAGNOSIS — M47.819 SPONDYLOSIS WITHOUT MYELOPATHY: Primary | ICD-10-CM

## 2017-02-23 PROCEDURE — 97112 NEUROMUSCULAR REEDUCATION: CPT | Performed by: PHYSICAL MEDICINE & REHABILITATION

## 2017-02-23 PROCEDURE — 97110 THERAPEUTIC EXERCISES: CPT | Performed by: PHYSICAL MEDICINE & REHABILITATION

## 2017-02-23 NOTE — PROGRESS NOTES
"Name: [facility] Number: [id number]  Diagnosis:   Encounter Diagnoses   Name Primary?    Right-sided low back pain with right-sided sciatica, unspecified chronicity     Spondylosis without myelopathy Yes     Physician: Gildardo Chu MD  Treatment Orders: PT Eval and Treat  Past Medical History   Diagnosis Date    Coma      after head trauma from fall    Diabetes mellitus, type 2     Hyperlipidemia     Hypertension        Precautions: None  Visit # authorized:  on 2017  Authorization period: 17  Plan of care expiration: 3/2/17  Start time: 3:00 PM  Stop Time: 3:55 PM    Procedures:  TE,NMRE      Timed:  Procedure: TE, x 2, NMRE  X 2  Minutes: 55      Untimed:  Procedure: 0  Minutes: 0      Total Timed Minutes: 55  Total Timed Units:  4  Total Untimed Units: 0  Charged Billed # of Units: 4      Medicare Charge: $130.00  Medicare Cap Total: $1340.00    Subjective     Pt reports: Patient states not having any pain in low back or right LE today. States working hard to walk properly and does not have any pain with walking.    Pain Scale: before treatment: 0/10 currently; after treatment: 0/10    Objective                                                                                       .   TREATMENT  Therapeutic exercise: [place] received therapeutic exercises to develop flexibility for 30 minutes includin:1 with PT   LTR X 10  Supine HSS 2 x 30" (B)  Supine Piriformis (S) (B) 2 x 30'  Prone quad/hip flexor (S) (B) 2 x 30"  Prone press up     2 X 10 (therapist holding hips down)  DKTC     2 X 10  TA's      10 x 10"   w/TA    2 x 10  March w/ TA     2 x 10  Quadriped:  Alt Arms    5 x 5" hold                      Alt Legs   3 x 5"  hold  Standing shoulder ext w/ feet staggered 2 x 10 GTB  Standing Back Extension X 10  Sitting Arch/Flatten(back extension)       2 x 10 x 5" hold  Stationary Bike  X 5' NC    Neuromuscular Re-education for balance and gait   X 25 " "minutes:1: 1 with PT  Standing in Parallell bars:    Marching      2 X 10  Step Ups 4 " step      2 x 10  Side Stepping over cones  X 2 trips  Walking backwards   20' x 4  Heel-toe walks 2 trips  Forward lunges     2  X 10  Mini Squats    3  X 10  Ambulation on treadmill at 1/0 mph x 3 minutes  with emphasis on bending right hip forward  and right knee in toe off phase to eliminate hip hiking and cicumduction      Written Home Exercises Provided:Continue with current HEP  Pt demo good understanding of the education provided. Sam demonstrated good return demonstration of activities.     Pt. education:  · Posture reeducation, body mechanics, HEP,   · No spiritual or educational barriers to learning provided  · Pt has no cultural, educational or language barriers to learning provided.    Assessment      Patient performed above exercise program with minimal verbal cueing for proper technique and performing exercises well. Patient instructed to continue with HEP, walking on treadmill at home and concentrating on proper gait pattern when walking. Patient has made good improvements toward his physical therapy goals. Patient still tends to hike and circumduct right hip with toe off phase of gait, especially when walking fast. However, patient no longer using AD and stands erect when walking.  Pt will continue to benefit from skilled outpatient physical therapy to address the remaining functional deficits, provide pt/family education, and to maximize pt's level of independence in the home and community environment and to meet LTG.  Patient has one remaining visit.    Goals:   Short Term Goals: 4 weeks MET  Increase range of motion 25%  Be able to perform HEP with minimal cueing required  Ambulate with FWB RLE using rollator  Improve functional impairment of mobility to 30%      Long Term Goals: 8 weeks  Increase range of motion to 75% to 100% full   Restore ability to ambulate with normal pain free gait  Walking for ADL " and exercise will be restored without increased pain  Restore ability to stand for ADL without increased pain  Restore ability to climb stairs in a reciprocal manner with min to 0 increased pain and/or difficulty  Restore ability to perform ADL's and household activities independently and without increased pain  Improve functional impairment of mobility to 20% or less    Anticipated barriers to physical therapy: None  Pt's spiritual, cultural and educational needs considered and pt agreeable to plan of care and goals        Plan   Continue with established Plan of Care towards PT goals. Will reassess next visit and plan for discharge.              Angelica Mckeon, PT

## 2017-03-07 ENCOUNTER — TELEPHONE (OUTPATIENT)
Dept: INTERNAL MEDICINE | Facility: CLINIC | Age: 57
End: 2017-03-07

## 2017-03-07 ENCOUNTER — DOCUMENTATION ONLY (OUTPATIENT)
Dept: REHABILITATION | Facility: HOSPITAL | Age: 57
End: 2017-03-07

## 2017-03-07 DIAGNOSIS — M47.819 SPONDYLOSIS WITHOUT MYELOPATHY: Primary | ICD-10-CM

## 2017-03-07 DIAGNOSIS — D64.9 ANEMIA, UNSPECIFIED TYPE: Primary | ICD-10-CM

## 2017-03-07 DIAGNOSIS — M54.41 BILATERAL LOW BACK PAIN WITH RIGHT-SIDED SCIATICA, UNSPECIFIED CHRONICITY: ICD-10-CM

## 2017-03-07 NOTE — PROGRESS NOTES
Mr. Flores was seen in outpatient physical therapy  For an initial evaluation on 1/5/17 for low back pain.  Mr. Flores was seen for 13 visits.  He made good improvements with physical therapy---on last visit no longer having low back pain; ambulating independent of AD and standing erect when ambulating.  Still tended to circumduct and hike right hip with toe off in gait phase.  Patient has self discharged from physical therapy as he has not returned for further physical therapy.  POC has ended and patient is discharged from physical therapy.      · G-code current: CJ at least 20% but < 40% impaired, limited or restricted   · G-code goal: CI at least 20%, but < 40% impaired, limited or restricted        G-code discharge: CJ at least 20%, but < 40% impaired, limited or restricted              Angelica Mckeon, PT

## 2017-03-07 NOTE — TELEPHONE ENCOUNTER
Notify pt. Diabetes is well controlled; he is mildly anemic; see if he is scheduled for colonoscopy and let me know; repeat CBC with ferritin in 1 month

## 2017-03-09 NOTE — TELEPHONE ENCOUNTER
Informed pt diabetes is well controlled. He is mildly anemic. Pt has not scheduled his colonoscopy yet. Repeat CBC with Ferritin in 1 mth.

## 2017-05-01 PROBLEM — Z00.00 PREVENTATIVE HEALTH CARE: Status: RESOLVED | Noted: 2017-01-27 | Resolved: 2017-05-01

## 2017-10-20 DIAGNOSIS — E11.9 TYPE 2 DIABETES MELLITUS WITHOUT COMPLICATION: ICD-10-CM

## 2018-04-30 ENCOUNTER — TELEPHONE (OUTPATIENT)
Dept: ADMINISTRATIVE | Facility: HOSPITAL | Age: 58
End: 2018-04-30

## 2018-04-30 NOTE — TELEPHONE ENCOUNTER
Outgoing call to patient to schedule an appt. regarding f/u B/P and annual wellness. Patient states he has a new physician under [facility], and no longer uses [facility].

## 2021-06-29 DIAGNOSIS — I82.491 ACUTE DEEP VEIN THROMBOSIS (DVT) OF OTHER SPECIFIED VEIN OF RIGHT LOWER EXTREMITY: Primary | ICD-10-CM

## 2021-06-29 DIAGNOSIS — I82.501 LEG DVT (DEEP VENOUS THROMBOEMBOLISM), CHRONIC, RIGHT: ICD-10-CM

## 2021-06-29 DIAGNOSIS — I82.491 ACUTE EMBOLISM AND THROMBOSIS OF OTHER SPECIFIED DEEP VEIN OF RIGHT LOWER EXTREMITY: Primary | ICD-10-CM

## 2023-04-21 DIAGNOSIS — M47.896 OTHER OSTEOARTHRITIS OF SPINE, LUMBAR REGION: Primary | ICD-10-CM

## 2023-04-21 DIAGNOSIS — M47.26 OTHER SPONDYLOSIS WITH RADICULOPATHY, LUMBAR REGION: ICD-10-CM

## 2023-04-21 DIAGNOSIS — M47.816 LUMBAR SPONDYLOSIS: ICD-10-CM

## 2023-05-11 ENCOUNTER — HOSPITAL ENCOUNTER (OUTPATIENT)
Dept: RADIOLOGY | Facility: HOSPITAL | Age: 63
Discharge: HOME | End: 2023-05-11
Attending: STUDENT IN AN ORGANIZED HEALTH CARE EDUCATION/TRAINING PROGRAM
Payer: MEDICARE

## 2023-05-11 DIAGNOSIS — M47.26 OTHER SPONDYLOSIS WITH RADICULOPATHY, LUMBAR REGION: ICD-10-CM

## 2023-05-11 DIAGNOSIS — M47.816 LUMBAR SPONDYLOSIS: ICD-10-CM

## 2023-05-11 DIAGNOSIS — M47.896 OTHER OSTEOARTHRITIS OF SPINE, LUMBAR REGION: ICD-10-CM

## 2023-05-11 PROCEDURE — 72148 MRI LUMBAR SPINE W/O DYE: CPT | Mod: 26,,, | Performed by: RADIOLOGY

## 2023-05-11 PROCEDURE — 72148 MRI LUMBAR SPINE W/O DYE: CPT | Mod: TC,PO

## 2023-05-11 PROCEDURE — 72148 MRI LUMBAR SPINE WITHOUT CONTRAST: ICD-10-PCS | Mod: 26,,, | Performed by: RADIOLOGY

## 2024-08-15 ENCOUNTER — OFFICE VISIT (OUTPATIENT)
Dept: PODIATRY | Facility: CLINIC | Age: 64
End: 2024-08-15
Payer: MEDICARE

## 2024-08-15 VITALS
HEIGHT: 69 IN | DIASTOLIC BLOOD PRESSURE: 80 MMHG | BODY MASS INDEX: 27.36 KG/M2 | WEIGHT: 184.75 LBS | HEART RATE: 98 BPM | SYSTOLIC BLOOD PRESSURE: 176 MMHG

## 2024-08-15 DIAGNOSIS — E11.9 TYPE 2 DIABETES MELLITUS WITHOUT COMPLICATION, WITHOUT LONG-TERM CURRENT USE OF INSULIN: Primary | ICD-10-CM

## 2024-08-15 DIAGNOSIS — L84 CORN OR CALLUS: ICD-10-CM

## 2024-08-15 DIAGNOSIS — E11.9 ENCOUNTER FOR DIABETIC FOOT EXAM: ICD-10-CM

## 2024-08-15 PROCEDURE — 3077F SYST BP >= 140 MM HG: CPT | Mod: CPTII,S$GLB,, | Performed by: PODIATRIST

## 2024-08-15 PROCEDURE — 3079F DIAST BP 80-89 MM HG: CPT | Mod: CPTII,S$GLB,, | Performed by: PODIATRIST

## 2024-08-15 PROCEDURE — 11055 PARING/CUTG B9 HYPRKER LES 1: CPT | Mod: S$GLB,,, | Performed by: PODIATRIST

## 2024-08-15 PROCEDURE — 3008F BODY MASS INDEX DOCD: CPT | Mod: CPTII,S$GLB,, | Performed by: PODIATRIST

## 2024-08-15 PROCEDURE — 1160F RVW MEDS BY RX/DR IN RCRD: CPT | Mod: CPTII,S$GLB,, | Performed by: PODIATRIST

## 2024-08-15 PROCEDURE — 99999 PR PBB SHADOW E&M-EST. PATIENT-LVL IV: CPT | Mod: PBBFAC,,, | Performed by: PODIATRIST

## 2024-08-15 PROCEDURE — 1159F MED LIST DOCD IN RCRD: CPT | Mod: CPTII,S$GLB,, | Performed by: PODIATRIST

## 2024-08-15 PROCEDURE — 4010F ACE/ARB THERAPY RXD/TAKEN: CPT | Mod: CPTII,S$GLB,, | Performed by: PODIATRIST

## 2024-08-15 PROCEDURE — 99203 OFFICE O/P NEW LOW 30 MIN: CPT | Mod: 25,S$GLB,, | Performed by: PODIATRIST

## 2024-08-15 RX ORDER — METOPROLOL SUCCINATE 200 MG/1
200 TABLET, EXTENDED RELEASE ORAL
COMMUNITY
Start: 2024-08-03

## 2024-08-15 RX ORDER — AMLODIPINE BESYLATE 10 MG/1
10 TABLET ORAL
COMMUNITY
Start: 2024-06-29

## 2024-08-15 RX ORDER — INSULIN HUMAN 100 [IU]/ML
INJECTION, SUSPENSION SUBCUTANEOUS
COMMUNITY
Start: 2024-03-06

## 2024-08-15 RX ORDER — CLONIDINE HYDROCHLORIDE 0.2 MG/1
1 TABLET ORAL 2 TIMES DAILY
COMMUNITY

## 2024-08-15 RX ORDER — LISINOPRIL AND HYDROCHLOROTHIAZIDE 20; 25 MG/1; MG/1
1 TABLET ORAL DAILY
COMMUNITY

## 2024-08-15 RX ORDER — LINAGLIPTIN 5 MG/1
5 TABLET, FILM COATED ORAL
COMMUNITY
Start: 2024-06-26

## 2024-08-15 RX ORDER — HYDRALAZINE HYDROCHLORIDE 50 MG/1
TABLET, FILM COATED ORAL
COMMUNITY
Start: 2024-08-13

## 2024-08-15 RX ORDER — PREDNISONE 20 MG/1
TABLET ORAL
COMMUNITY
Start: 2024-03-21

## 2024-08-15 RX ORDER — ROSUVASTATIN CALCIUM 20 MG/1
20 TABLET, COATED ORAL NIGHTLY
COMMUNITY
Start: 2024-07-08

## 2024-08-15 RX ORDER — LANOLIN ALCOHOL/MO/W.PET/CERES
1 CREAM (GRAM) TOPICAL
COMMUNITY
Start: 2024-06-02

## 2024-08-15 RX ORDER — HYDRALAZINE HYDROCHLORIDE 100 MG/1
TABLET, FILM COATED ORAL
COMMUNITY
Start: 2024-06-03

## 2024-08-15 RX ORDER — BENZONATATE 100 MG/1
200 CAPSULE ORAL
COMMUNITY
Start: 2024-03-28

## 2024-08-15 RX ORDER — FENOFIBRIC ACID 135 MG/1
1 CAPSULE, DELAYED RELEASE ORAL DAILY
COMMUNITY

## 2024-08-15 RX ORDER — ALBUTEROL SULFATE 90 UG/1
2 INHALANT RESPIRATORY (INHALATION) EVERY 4 HOURS PRN
COMMUNITY
Start: 2024-03-21

## 2024-08-15 RX ORDER — DULAGLUTIDE 0.75 MG/.5ML
0.75 INJECTION, SOLUTION SUBCUTANEOUS
COMMUNITY

## 2024-08-15 RX ORDER — LOSARTAN POTASSIUM 100 MG/1
100 TABLET ORAL
COMMUNITY
Start: 2024-07-07

## 2024-08-15 RX ORDER — FLUTICASONE PROPIONATE 50 MCG
2 SPRAY, SUSPENSION (ML) NASAL DAILY PRN
COMMUNITY
Start: 2024-02-19

## 2024-08-15 RX ORDER — BISMUTH SUBCITRATE POTASSIUM, METRONIDAZOLE, TETRACYCLINE HYDROCHLORIDE 140; 125; 125 MG/1; MG/1; MG/1
120 CAPSULE ORAL
COMMUNITY
Start: 2024-03-11

## 2024-08-15 RX ORDER — LANCETS
1 EACH MISCELLANEOUS 3 TIMES DAILY
COMMUNITY
Start: 2024-05-01

## 2024-08-27 NOTE — PATIENT INSTRUCTIONS
How to Check Your Feet    Below are tips to help you look for foot problems. Try to check your feet at the same time each day, such as when you get out of bed in the morning.    Check the top of each foot. The tops of toes, back of the heel, and outer edge of the foot can get a lot of rubbing from poor-fitting shoes.    Check the bottom of each foot. Daily wear and tear often leads to problems at pressure spots.    Check the toes and nails. Fungal infections often occur between toes. Toenail problems can also be a sign of fungal infections or lead to breaks in the skin.    Check your shoes, too. Loose objects inside a shoe can injure the foot. Use your hand to feel inside your shoes for things like hilton, loose stitching, or rough areas that could irritate your skin.        Diabetic Foot Care    Diabetes can lead to a number of different foot complications. Fortunately, most of these complications can be prevented with a little extra foot care. If diabetes is not well controlled, the high blood sugar can cause damage to blood vessels and result in poor circulation to the foot. When the skin does not get enough blood flow, it becomes prone to pressure sores and ulcers, which heal slowly.  High blood sugar can also damage nerves, interfering with the ability to feel pain and pressure. When you cant feel your foot normally, it is easy to injure your skin, bones and joints without knowing it. For these reasons diabetes increases the risk of fungal infections, bunions and ulcers. Deep ulcers can lead to bone infection. Gangrene is the most serious foot complication of diabetes. It usually occurs on the tips of the toes as blacked areas of skin. The black area is dead tissue. In severe cases, gangrene spreads to involve the entire toe, other toes and the entire foot. Foot or toe amputation may be required. Good foot care and blood sugar control can prevent this.    Home Care  Wear comfortable, proper fitting  shoes.  Wash your feet daily with warm water and mild soap.  After drying, apply a moisturizing cream or lotion.  Check your feet daily for skin breaks, blisters, swelling, or redness. Look between your toes also.  Wear cotton socks and change them every day.  Trim toe nails carefully and do not cut your cuticles.  Strive to keep your blood sugar under control with a combination of medicines, diet and activity.  If you smoke and have diabetes, it is very important that you stop. Smoking reduces blood flow to your foot.  Avoid activities that increase your risk of foot injury:  Do not walk barefoot.  Do not use heating pads or hot water bottles on your feet.  Do not put your foot in a hot tub without first checking the temperature with your hand.  10) Schedule yearly foot exams.    Follow Up  with your doctor or as advised by our staff. Report any cut, puncture, scrape, other injury, blister, ingrown toenail or ulcer on your foot.    Get Prompt Medical Attention  if any of the following occur:  -- Open ulcer with pus draining from the wound  -- Increasing foot or leg pain  -- New areas of redness or swelling or tender areas of the foot    © 1866-0275 [employer] [address] [place] [address], [place] [address]. All rights reserved. This information is not intended as a substitute for professional medical care. Always follow your healthcare professional's instructions.

## 2024-08-27 NOTE — PROGRESS NOTES
"Chief Complaint   Patient presents with    Nail Care     Nail Care/PCP Lorena Alaniz DO  01/27/24           MEDICAL DECISION MAKING:  Problem List Items Addressed This Visit       Type 2 diabetes mellitus without complication, without long-term current use of insulin - Primary    Overview     continue current regimen and encouraged ADA diet; get HGBA1C and start asa QD         Relevant Medications    TRULICITY 0.75 mg/0.5 mL pen injector    HUMULIN 70/30 U-100 KWIKPEN 100 unit/mL (70-30) InPn pen    TRADJENTA 5 mg Tab tablet    Other Relevant Orders    Routine Foot Care     Other Visit Diagnoses       Corn or callus        Relevant Orders    Routine Foot Care    Encounter for diabetic foot exam                I counseled the patient on the patient's conditions, their implications and medical management.   Shoe inspection.     Continue good nutrition and blood sugar control to help prevent podiatric complications of diabetes.   Maintain proper foot hygiene.   Continue wearing proper shoe gear, daily foot inspections, never walking without protective shoe gear, never putting sharp instruments to feet.    Routine Foot Care    Date/Time: 8/15/2024 3:45 PM    Performed by: Mary Kate Reese DPM  Authorized by: Mary Kate Reese DPM    Time out: Immediately prior to procedure a "time out" was called to verify the correct patient, procedure, equipment, support staff and site/side marked as required.    Hyperkeratotic Skin Lesions?: Yes    Number of trimmed lesions:  1  Location(s):  Left 5th Metatarsal Head    Nail Care Type:  Trim (no nails trimmed)  Patient tolerance:  Patient tolerated the procedure well with no immediate complications     With patient's permission, the toenails mentioned above were reduced and debrided using a nail nipper, removing offending nail and debris.   Utilizing a #15 scalpel, I trimmed the corns and calluses at the above mentioned location.      The patient will continue to monitor the areas " daily, inspect the feet, wear protective shoe gear when ambulatory, and moisturizer to maintain skin integrity.            HISTORY OF PRESENT ILLNESS:   The patient is a 63 y.o.  male  who presents for a diabetic foot exam.     Patient reports no presence of abnormal sensation to the feet .    History of diabetic foot ulcers: none  History of foot surgery: none.     Painful callus sub 5th metatarsal head LEFT foot.       The patient is under the Active Care of  Vish Canales MD for the qualifying diagnosis of diabetes mellitus.   Last encounter on:  7/24/2024      Patient Active Problem List   Diagnosis    Type 2 diabetes mellitus without complication, without long-term current use of insulin    Hypertension associated with diabetes    Hyperlipidemia    Colon cancer screening    Need for prophylactic vaccination with Streptococcus pneumoniae (Pneumococcus) and Influenza vaccines    Prostate cancer screening    Overweight (BMI 25.0-29.9)    Arthralgia of right knee         Current Outpatient Medications on File Prior to Visit   Medication Sig Dispense Refill    albuterol (PROVENTIL/VENTOLIN HFA) 90 mcg/actuation inhaler 2 puffs every 4 (four) hours as needed.      amLODIPine (NORVASC) 10 MG tablet Take 10 mg by mouth.      benzonatate (TESSALON) 100 MG capsule Take 200 mg by mouth.      cloNIDine (CATAPRES) 0.2 MG tablet Take 1 tablet by mouth 2 (two) times daily.      fenofibric acid (FIBRICOR) 135 mg CpDR Take 1 capsule by mouth once daily.      fluticasone propionate (FLONASE) 50 mcg/actuation nasal spray 2 sprays by Each Nostril route daily as needed.      glipiZIDE (GLUCOTROL) 10 MG TR24 Take 10 mg by mouth 2 (two) times daily with meals.       HUMULIN 70/30 U-100 KWIKPEN 100 unit/mL (70-30) InPn pen Inject into the skin.      hydrALAZINE (APRESOLINE) 100 MG tablet 1 tablet  twice a day      hydrALAZINE (APRESOLINE) 50 MG tablet 1 tablet  twice a day      lancets Misc Apply 1 each topically 3 (three) times daily.  "     lisinopril (PRINIVIL,ZESTRIL) 20 MG tablet Take 20 mg by mouth once daily.      lisinopriL-hydrochlorothiazide (PRINZIDE,ZESTORETIC) 20-25 mg Tab Take 1 tablet by mouth once daily.      losartan (COZAAR) 100 MG tablet Take 100 mg by mouth.      magnesium oxide (MAG-OX) 400 mg (241.3 mg magnesium) tablet Take 1 tablet by mouth.      metformin (FORTAMET) 500 mg 24 hr tablet Take 500 mg by mouth 2 (two) times daily with meals.       metoprolol succinate (TOPROL-XL) 200 MG 24 hr tablet Take 200 mg by mouth.      naproxen (NAPROSYN) 375 MG tablet Take 375 mg by mouth 2 (two) times daily.      pravastatin (PRAVACHOL) 20 MG tablet Take 20 mg by mouth once daily.      predniSONE (DELTASONE) 20 MG tablet Take by mouth.      PYLERA 140-125-125 mg per capsule 120 capsules.      rosuvastatin (CRESTOR) 20 MG tablet Take 20 mg by mouth every evening.      sucralfate (CARAFATE) 1 gram tablet Take 1 g by mouth 2 (two) times daily.       TRADJENTA 5 mg Tab tablet Take 5 mg by mouth.      TRULICITY 0.75 mg/0.5 mL pen injector Inject 0.75 mg into the skin every 7 days.      aspirin (ECOTRIN) 81 MG EC tablet Take 1 tablet (81 mg total) by mouth once daily.  0     No current facility-administered medications on file prior to visit.       Review of patient's allergies indicates:  No Known Allergies      ROS:  General ROS: negative  Respiratory ROS: no cough, shortness of breath, or wheezing  Cardiovascular ROS: no chest pain or dyspnea on exertion  Musculoskeletal ROS: negative  Dermatological ROS: negative      LAST HbA1c:   Lab Results   Component Value Date    HGBA1C 6.4 (H) 01/30/2017         EXAM:   Vitals:    08/15/24 1534   BP: (!) 176/80   Pulse: 98   Weight: 83.8 kg (184 lb 11.9 oz)   Height: 5' 9" (1.753 m)       General: alert, no distress, cooperative    Vascular:   Dorsalis Pedis:  present     Posterior Tibial:  present  Capillary refill time:  3 seconds  Temperature of toes warm to touch  Edema:  " trace      Neurological:     Sharp touch:  normal  Light touch: normal  Tinels Sign:  Absent  Mulders Click:   Absent  Margie:  Absent deficits to sharp/dull, light touch or vibratory sensation feet, ten points tested.    Absent paresthesias (Abnormal spontaneous sensations in feet)      Dermatological:   Skin: thin, warm, and appropriate for age  Hair growth:  decreased  Wounds/Ulcers:  Absent  Bruising:  Absent  Erythema:  Absent  Toenails:   elongated;  thickness:  normal;   Brownish in color,  without subungual debris.   Absent paronychia  Callus sub 5th metatarsal head LEFT foot       Musculoskeletal:   Metatarsophalangeal range of motion:   full range of motion  Subtalar joint range of motion: full range of motion  Ankle joint range of motion:  full range of motion

## 2024-10-25 ENCOUNTER — HOSPITAL ENCOUNTER (EMERGENCY)
Facility: HOSPITAL | Age: 64
Discharge: HOME | End: 2024-10-25
Attending: FAMILY MEDICINE
Payer: MEDICARE

## 2024-10-25 VITALS
BODY MASS INDEX: 32.44 KG/M2 | HEART RATE: 95 BPM | OXYGEN SATURATION: 99 % | DIASTOLIC BLOOD PRESSURE: 78 MMHG | WEIGHT: 219 LBS | SYSTOLIC BLOOD PRESSURE: 131 MMHG | HEIGHT: 69 IN | RESPIRATION RATE: 19 BRPM | TEMPERATURE: 98 F

## 2024-10-25 DIAGNOSIS — T14.90XA INJURY: ICD-10-CM

## 2024-10-25 DIAGNOSIS — M25.571 ACUTE RIGHT ANKLE PAIN: Primary | ICD-10-CM

## 2024-10-25 PROCEDURE — 25000003 PHARM REV CODE 250: Mod: ER | Performed by: FAMILY MEDICINE

## 2024-10-25 PROCEDURE — 99283 EMERGENCY DEPT VISIT LOW MDM: CPT | Mod: 25,ER

## 2024-10-25 RX ORDER — KETOROLAC TROMETHAMINE 10 MG/1
10 TABLET, FILM COATED ORAL
Status: COMPLETED | OUTPATIENT
Start: 2024-10-25 | End: 2024-10-25

## 2024-10-25 RX ORDER — NAPROXEN 500 MG/1
500 TABLET ORAL 2 TIMES DAILY
Qty: 20 TABLET | Refills: 0 | Status: SHIPPED | OUTPATIENT
Start: 2024-10-25

## 2024-10-25 RX ADMIN — KETOROLAC TROMETHAMINE 10 MG: 10 TABLET, FILM COATED ORAL at 05:10

## 2025-04-12 ENCOUNTER — HOSPITAL ENCOUNTER (EMERGENCY)
Facility: HOSPITAL | Age: 65
Discharge: HOME | End: 2025-04-12
Attending: STUDENT IN AN ORGANIZED HEALTH CARE EDUCATION/TRAINING PROGRAM
Payer: MEDICARE

## 2025-04-12 VITALS
BODY MASS INDEX: 32.58 KG/M2 | TEMPERATURE: 98 F | DIASTOLIC BLOOD PRESSURE: 78 MMHG | WEIGHT: 220 LBS | OXYGEN SATURATION: 97 % | HEIGHT: 69 IN | SYSTOLIC BLOOD PRESSURE: 163 MMHG | HEART RATE: 110 BPM | RESPIRATION RATE: 18 BRPM

## 2025-04-12 DIAGNOSIS — L60.2 OVERGROWN TOENAILS: Primary | ICD-10-CM

## 2025-04-12 PROCEDURE — 99281 EMR DPT VST MAYX REQ PHY/QHP: CPT | Mod: ER

## 2025-04-13 NOTE — ED PROVIDER NOTES
Encounter Date: 4/12/2025       History     Chief Complaint   Patient presents with    Toe Pain     Pt states he needs to get his toenails cut because they are causing him pain.      Patient is a 64 y.o. male who presents to the ED requesting med his toenails be cup.  Patient states that he has not had his toenails cut in many months and that they are causing him irritation.  Patient has a diabetic, states that he does not currently have a podiatrist.  Says that he does not cut his toenails at home because he has been told not to because of his diabetes.  Patient denies any concerns about possible infection, states that his toenails are just simply irritating him.  Patient is able to ambulate.  Denies fever, chills, erythema, purulent drainage, or any other complaints at this time.      The history is provided by the patient.     Review of patient's allergies indicates:  No Known Allergies  Past Medical History:   Diagnosis Date    Coma 1995    after head trauma from fall    Diabetes mellitus, type 2     Hyperlipidemia     Hypertension      No past surgical history on file.  Family History   Problem Relation Name Age of Onset    Diabetes Mother      Cancer Mother      Liver cancer Mother      Cancer Father      Stroke Father       Social History[1]  Review of Systems   Constitutional:  Negative for chills and fever.   Musculoskeletal:  Negative for gait problem and joint swelling.   Skin:  Negative for color change and wound.       Physical Exam     Initial Vitals [04/12/25 2041]   BP Pulse Resp Temp SpO2   (!) 163/78 110 18 98.3 °F (36.8 °C) 97 %      MAP       --         Physical Exam    Constitutional: He appears well-developed and well-nourished.   HENT:   Head: Normocephalic and atraumatic.   Musculoskeletal:      Comments: Toenails on all toes are long and hypertrophic.  No ingrown toenails.  No erythema.  No drainage.  2+ pedal pulse.     Neurological: He is alert.         ED Course   Procedures  Labs  Reviewed - No data to display       Imaging Results    None          Medications - No data to display  Medical Decision Making  Patient is an afebrile, well appearing 64 y.o. male requesting his toenails be trimmed.. VSS and do not suggest sepsis.  Denies chest pain, SOB, or any other complaints at this time. A&Ox4. The patient remained comfortable and stable during their visit in the ED. Details of ED course documented in ED workup.     Differential diagnosis includes, but is not limited to:  Overgrown toenail, Ingrown toenail, diabetic foot wound, paronychia, osteomyelitis, felon    All historical, clinical findings reviewed.  Patient's toenails are noted to be overgrown and summer hypertrophic.  There was no indication of infection on exam.  I had lengthy discussion with the patient that toenail trimming is not routinely done in the ER.  I placed a referral to Podiatry at [facility] and also gave him the numbers of several local podiatrist for diabetic foot care.  There are no concerning features on physical exam to suggest an emergent or life threatening condition. No further intervention is indicated at this time and I am of the belief that that it is safe to discharge the patient from the emergency department.     Patient has been counseled regarding the need for follow-up as well as the indications to return to the emergency room should new or worrisome developments (including but not limited to worsening pain, cyanosis, or loss of strength or sensation) occur. Additionally, patient instructed to follow up with PCP and with Podiatry in 2-3 days for recheck of today's complaints.    Discharge and follow-up instructions discussed with the patient who expressed understanding and willingness to comply with recommendations. Patient discharged from the emergency department in stable condition, in no acute distress.                                      Clinical Impression:  Final diagnoses:  [L60.2] Overgrown toenails  "(Primary)          ED Disposition Condition    Discharge Stable          ED Prescriptions    None       Follow-up Information    None            [1]   Social History  Tobacco Use    Smoking status: Never   Substance Use Topics    Alcohol use: No    Drug use: Not Currently     Comment: " long time ago "        Nusrat Fang PA-C  04/12/25 [id number]    "

## 2025-04-13 NOTE — DISCHARGE INSTRUCTIONS
Thank you for letting me care for you today - it was nice to meet you and I hope you feel better soon. I have placed a referral to [facility] for follow up care. You can call [phone] ([phone]) to schedule. You can also schedule on the [facility] walter.     Some other local podiatrist:    [facility]  [address] [place] [id number] [phone]    [facility]  [address] [place] [address]  [phone]    Our goal at [facility] is to always give you outstanding care and exceptional service. You may receive a survey by mail or email in the next week about your experience in our ED. We would greatly appreciate you completing and returning the survey. Your feedback provides us with a way to recognize our staff who give very good care and it helps us learn how to improve when your experience was below our aspiration of excellence.     All the best,     Nusrat Fang, MPH, PA-C  Emergency Department Physician Assistant  [facility], [facility]